# Patient Record
Sex: MALE | Race: WHITE | NOT HISPANIC OR LATINO | Employment: UNEMPLOYED | URBAN - NONMETROPOLITAN AREA
[De-identification: names, ages, dates, MRNs, and addresses within clinical notes are randomized per-mention and may not be internally consistent; named-entity substitution may affect disease eponyms.]

---

## 2023-08-21 ENCOUNTER — OFFICE VISIT (OUTPATIENT)
Dept: PSYCHIATRY | Facility: CLINIC | Age: 23
End: 2023-08-21
Payer: COMMERCIAL

## 2023-08-21 VITALS
SYSTOLIC BLOOD PRESSURE: 130 MMHG | DIASTOLIC BLOOD PRESSURE: 84 MMHG | HEIGHT: 71 IN | HEART RATE: 82 BPM | WEIGHT: 170.4 LBS | BODY MASS INDEX: 23.85 KG/M2

## 2023-08-21 DIAGNOSIS — F33.1 MAJOR DEPRESSIVE DISORDER, RECURRENT EPISODE, MODERATE: ICD-10-CM

## 2023-08-21 DIAGNOSIS — Z79.899 MEDICATION MANAGEMENT: ICD-10-CM

## 2023-08-21 DIAGNOSIS — F41.1 GENERALIZED ANXIETY DISORDER: Primary | ICD-10-CM

## 2023-08-21 DIAGNOSIS — F41.0 PANIC ATTACKS: ICD-10-CM

## 2023-08-21 LAB
EXTERNAL AMPHETAMINE SCREEN URINE: NEGATIVE
EXTERNAL BENZODIAZEPINE SCREEN URINE: NEGATIVE
EXTERNAL BUPRENORPHINE SCREEN URINE: NEGATIVE
EXTERNAL COCAINE SCREEN URINE: NEGATIVE
EXTERNAL MDMA: NEGATIVE
EXTERNAL METHADONE SCREEN URINE: NEGATIVE
EXTERNAL METHAMPHETAMINE SCREEN URINE: NEGATIVE
EXTERNAL OPIATES SCREEN URINE: NEGATIVE
EXTERNAL OXYCODONE SCREEN URINE: NEGATIVE
EXTERNAL THC SCREEN URINE: POSITIVE

## 2023-08-21 PROCEDURE — 90792 PSYCH DIAG EVAL W/MED SRVCS: CPT | Performed by: NURSE PRACTITIONER

## 2023-08-21 PROCEDURE — 1159F MED LIST DOCD IN RCRD: CPT | Performed by: NURSE PRACTITIONER

## 2023-08-21 PROCEDURE — 1160F RVW MEDS BY RX/DR IN RCRD: CPT | Performed by: NURSE PRACTITIONER

## 2023-08-21 RX ORDER — FLUOXETINE 10 MG/1
CAPSULE ORAL
COMMUNITY
Start: 2023-07-27 | End: 2023-08-22

## 2023-08-21 NOTE — PROGRESS NOTES
"Mehran Grace is a 23 y.o. male who is here today for initial appointment to evaluate for medication options.     Chief Complaint: Anxiety    HPI: Patient presents as initial evaluation as referred by PCP.  States at 19 he began having panic attacks with increased anxiety and was started on fluoxetine.  States he took medication for an extended period of time and stopped as he felt she no longer needed it.  States he restarted medication 5 months ago due to reoccurring symptoms however he feels it has not been helpful.  States previously he was taking 20 mg and PCP had increased it to 30 and at that time he felt it made her anxiety worse.  States he recently underwent some changes as far as quitting his job to begin a different 1 and is currently living with his mother to help her as she is healing from an injury.  Also reports having panic attacks consisting of feeling short of breath, shakiness, uncontrollable crying, and overwhelming sense of doom, states these episodes last 5-6 minutes and occur 1-2 times per week.  The patient reports the following symptoms of anxiety: constant anxiety/worry, restlessness/on edge, difficulty concentrating, mind goes blank, irritability, muscle tension, and sleep disturbance and have caused impairment in important areas of functioning. Anxiety rated 7/10 with 10 being the worst.   The patient reports depressive symptoms including depressed mood, crying spells, anhedonia, feelings of guilt, feelings of hopelessness, feelings of worthlessness, low energy, and difficulty concentrating, and have caused impairment in important areas of functioning.  Depression rated 7/10 with 10 being the worst.   Also reports drinking alcohol primarily on the weekends to \"self-medicate\" his anxiety, states he is drinking 12-18 beers a night when he does drink.  He denies any symptoms of tika.  He reports sleep fluctuates depending on outcome of the day.  Reports appetite is good.  He " "denies any self-harm.  He denies SI/HI/AVH.        The following portions of the patient's history were reviewed and updated as appropriate: allergies, current medications, past family history, past medical history, past social history, past surgical history and problem list.    Past Psych History: Reports being adopted and raised by grandparents, he has no contact with bio parents.  Denies any history of abuse.     Previous Psych Meds: Fluoxetine    Substance Abuse: Reports using delta 8 \"rarely\".  Drinks alcohol on the weekends consisting of 12-18 beers and 1-2 mixed drinks.    Social History: Patient from [facility], currently living in [facility] with his mother.  He is currently unemployed as he is caring for his mother.  Has high school diploma.  No .  Denies any legal issues.    Family History:  Family History   Problem Relation Age of Onset    No Known Problems Mother     No Known Problems Father        Past Surgical History:  History reviewed. No pertinent surgical history.    Problem List:  There is no problem list on file for this patient.      Allergy:  No Known Allergies      Current Medications:   Current Outpatient Medications   Medication Sig Dispense Refill    propranolol (INDERAL) 10 MG tablet Take 1 tablet by mouth 2 (two) times a day as needed for anxiety. 60 tablet 1    sertraline (Zoloft) 50 MG tablet Take 0.5 tablet by mouth daily for 7 days then increase to 1 tablet by mouth daily 30 tablet 0     No current facility-administered medications for this visit.       Review of Systems  Review of Systems   Constitutional:  Positive for fatigue.   HENT: Negative.     Eyes: Negative.    Respiratory: Negative.     Cardiovascular: Negative.    Gastrointestinal: Negative.    Genitourinary: Negative.    Musculoskeletal: Negative.    Skin: Negative.    Neurological: Negative.    Psychiatric/Behavioral:  Positive for decreased concentration, sleep disturbance and depressed mood. " "Negative for suicidal ideas. The patient is nervous/anxious.      Objective   Physical Exam  Blood pressure 130/84, pulse 82, height 180.3 cm (71\"), weight 77.3 kg (170 lb 6.4 oz).    Appearance: Well nourished male, appropriately dressed, appears stated age and in no acute distress  Gait, Station, Strength: WNL    Mental Status Exam:   Hygiene:   good  Cooperation:  Cooperative  Eye Contact:  Good  Psychomotor Behavior:  Appropriate  Affect:  Appropriate  Hopelessness: Denies  Speech:  Normal  Thought Process:  Linear  Thought Content:  Mood congruent  Suicidal:  None  Homicidal:  None  Hallucinations:  None  Delusion:  None  Memory:  Intact  Orientation:  Person, Place, Time, and Situation  Reliability:  good  Insight:  Good  Judgement:  Fair  Impulse Control:  Good  Physical/Medical Issues:  No     PHQ-Score Total:  PHQ-9 Total Score:   8   Patient screened positive for depression based on a PHQ-9 score of 8 on 8/21/2023. Follow-up recommendations include: Prescribed antidepressant medication treatment and Suicide Risk Assessment performed.        Lab Results:   Office Visit on 08/21/2023   Component Date Value Ref Range Status    External Amphetamine Screen Urine 08/21/2023 Negative   Final    External Benzodiazepine Screen Uri* 08/21/2023 Negative   Final    External Cocaine Screen Urine 08/21/2023 Negative   Final    External THC Screen Urine 08/21/2023 Positive (A)   Final    External Methadone Screen Urine 08/21/2023 Negative   Final    External Methamphetamine Screen Ur* 08/21/2023 Negative   Final    External Oxycodone Screen Urine 08/21/2023 Negative   Final    External Buprenorphine Screen Urine 08/21/2023 Negative   Final    External MDMA 08/21/2023 Negative   Final    External Opiates Screen Urine 08/21/2023 Negative   Final       Assessment & Plan   Diagnoses and all orders for this visit:    1. Generalized anxiety disorder (Primary)    2. Medication management  -     [facility] Drug Screen    3. Panic " attacks    4. Major depressive disorder, recurrent episode, moderate    Other orders  -     propranolol (INDERAL) 10 MG tablet; Take 1 tablet by mouth 2 (two) times a day as needed for anxiety.  Dispense: 60 tablet; Refill: 1  -     sertraline (Zoloft) 50 MG tablet; Take 0.5 tablet by mouth daily for 7 days then increase to 1 tablet by mouth daily  Dispense: 30 tablet; Refill: 0      -Begin cross taper of fluoxetine with sertraline  -Decrease fluoxetine 20 mg daily for 7 days and discontinue  -Begin sertraline 25 mg daily for 7 days then increase to 50 mg daily for anxiety and depression.  Patient was educated concerning Black Box Warning of increased suicidal thoughts and behaviors with SSRIs   -Begin propranolol 10 mg twice daily as needed for anxiety  -Encouraged patient to begin psychotherapy  -Patient's [place] tox was positive for THC. Negative effects of THC use discussed and patient advised against use of THC. Long term effects reviewed including but not limited to:  potential interruption of brain connectivity,  poor memory, impaired cognition, psychosis fall, oversedation especially when used with opioids   -LAUREANO reviewed and appropriate. Patient counseled on use of controlled substances.   -The benefits of a healthy diet and exercise were discussed with patient, especially the positive effects they have on mental health. Patient encouraged to consider lifestyle modification regarding  diet and exercise patterns to maximize results of mental health treatment.  -Reviewed previous available documentation  -Reviewed most recent available labs                  Visit Diagnoses:    ICD-10-CM ICD-9-CM   1. Generalized anxiety disorder  F41.1 300.02   2. Medication management  Z79.899 V58.69   3. Panic attacks  F41.0 300.01   4. Major depressive disorder, recurrent episode, moderate  F33.1 296.32       TREATMENT PLAN/GOALS: Continue supportive psychotherapy efforts and medications as indicated. Treatment and  medication options discussed during today's visit. Patient acknowledged and verbally consented to continue with current treatment plan and was educated on the importance of compliance with treatment and follow-up appointments.    MEDICATION ISSUES:  Discussed medication options and treatment plan of prescribed medication as well as the risks, benefits, and side effects including potential falls, possible impaired driving and metabolic adversities among others. Patient is agreeable to call the office with any worsening of symptoms or onset of side effects. Patient is agreeable to call 911 or go to the nearest ER should he/she begin having SI/HI.     MEDS ORDERED DURING VISIT:  New Medications Ordered This Visit   Medications    propranolol (INDERAL) 10 MG tablet     Sig: Take 1 tablet by mouth 2 (two) times a day as needed for anxiety.     Dispense:  60 tablet     Refill:  1    sertraline (Zoloft) 50 MG tablet     Sig: Take 0.5 tablet by mouth daily for 7 days then increase to 1 tablet by mouth daily     Dispense:  30 tablet     Refill:  0     Return in about 4 weeks (around 9/18/2023), or if symptoms worsen or fail to improve.         Prognosis: Guarded dependent on medication/follow up and treatment plan compliance.  Functionality: pt showing improvements in important areas of daily functioning.     Short-term goals: Patient will adhere to medication regimen and note continued improvement in symptoms over the next 3 months.   Long-term goals: Patient will be adherent to medication management and psychotherapy with continued improvement in symptoms over the next 6 months      This document has been electronically signed by CANDICE Olguin   August 22, 2023 15:24 EDT    Part of this note may be an electronic transcription/translation of spoken language to printed text using the Dragon Dictation System.

## 2023-08-22 RX ORDER — PROPRANOLOL HYDROCHLORIDE 10 MG/1
10 TABLET ORAL 2 TIMES DAILY PRN
Qty: 60 TABLET | Refills: 1 | Status: SHIPPED | OUTPATIENT
Start: 2023-08-22

## 2023-10-02 ENCOUNTER — HOSPITAL ENCOUNTER (EMERGENCY)
Facility: HOSPITAL | Age: 23
Discharge: PSYCHIATRIC OR SUBSTANCE USE FACILITY | End: 2023-10-02
Attending: STUDENT IN AN ORGANIZED HEALTH CARE EDUCATION/TRAINING PROGRAM | Admitting: STUDENT IN AN ORGANIZED HEALTH CARE EDUCATION/TRAINING PROGRAM
Payer: COMMERCIAL

## 2023-10-02 ENCOUNTER — HOSPITAL ENCOUNTER (INPATIENT)
Facility: HOSPITAL | Age: 23
LOS: 7 days | Discharge: HOME | DRG: 885 | End: 2023-10-09
Attending: PSYCHIATRY & NEUROLOGY | Admitting: PSYCHIATRY & NEUROLOGY
Payer: COMMERCIAL

## 2023-10-02 VITALS
TEMPERATURE: 97.4 F | HEART RATE: 72 BPM | OXYGEN SATURATION: 98 % | BODY MASS INDEX: 23.8 KG/M2 | WEIGHT: 170 LBS | HEIGHT: 71 IN | RESPIRATION RATE: 18 BRPM | DIASTOLIC BLOOD PRESSURE: 83 MMHG | SYSTOLIC BLOOD PRESSURE: 130 MMHG

## 2023-10-02 DIAGNOSIS — F32.A DEPRESSION WITH SUICIDAL IDEATION: Primary | ICD-10-CM

## 2023-10-02 DIAGNOSIS — R45.851 DEPRESSION WITH SUICIDAL IDEATION: Primary | ICD-10-CM

## 2023-10-02 PROBLEM — F32.9 MDD (MAJOR DEPRESSIVE DISORDER): Status: ACTIVE | Noted: 2023-10-02

## 2023-10-02 LAB
ALBUMIN SERPL-MCNC: 5.1 G/DL (ref 3.5–5.2)
ALBUMIN/GLOB SERPL: 2 G/DL
ALP SERPL-CCNC: 130 U/L (ref 39–117)
ALT SERPL W P-5'-P-CCNC: 51 U/L (ref 1–41)
AMPHET+METHAMPHET UR QL: NEGATIVE
AMPHETAMINES UR QL: NEGATIVE
ANION GAP SERPL CALCULATED.3IONS-SCNC: 12.5 MMOL/L (ref 5–15)
AST SERPL-CCNC: 38 U/L (ref 1–40)
BARBITURATES UR QL SCN: NEGATIVE
BASOPHILS # BLD AUTO: 0.03 10*3/MM3 (ref 0–0.2)
BASOPHILS NFR BLD AUTO: 0.5 % (ref 0–1.5)
BENZODIAZ UR QL SCN: NEGATIVE
BILIRUB SERPL-MCNC: 1.5 MG/DL (ref 0–1.2)
BILIRUB UR QL STRIP: NEGATIVE
BUN SERPL-MCNC: 14 MG/DL (ref 6–20)
BUN/CREAT SERPL: 15.6 (ref 7–25)
BUPRENORPHINE SERPL-MCNC: NEGATIVE NG/ML
CALCIUM SPEC-SCNC: 10.3 MG/DL (ref 8.6–10.5)
CANNABINOIDS SERPL QL: POSITIVE
CHLORIDE SERPL-SCNC: 103 MMOL/L (ref 98–107)
CLARITY UR: CLEAR
CO2 SERPL-SCNC: 25.5 MMOL/L (ref 22–29)
COCAINE UR QL: NEGATIVE
COLOR UR: YELLOW
CREAT SERPL-MCNC: 0.9 MG/DL (ref 0.76–1.27)
DEPRECATED RDW RBC AUTO: 37.3 FL (ref 37–54)
EGFRCR SERPLBLD CKD-EPI 2021: 124.6 ML/MIN/1.73
EOSINOPHIL # BLD AUTO: 0.1 10*3/MM3 (ref 0–0.4)
EOSINOPHIL NFR BLD AUTO: 1.6 % (ref 0.3–6.2)
ERYTHROCYTE [DISTWIDTH] IN BLOOD BY AUTOMATED COUNT: 11.6 % (ref 12.3–15.4)
ETHANOL BLD-MCNC: <10 MG/DL (ref 0–10)
ETHANOL UR QL: <0.01 %
FENTANYL UR-MCNC: NEGATIVE NG/ML
GLOBULIN UR ELPH-MCNC: 2.5 GM/DL
GLUCOSE SERPL-MCNC: 100 MG/DL (ref 65–99)
GLUCOSE UR STRIP-MCNC: NEGATIVE MG/DL
HAV IGM SERPL QL IA: NORMAL
HBV CORE IGM SERPL QL IA: NORMAL
HBV SURFACE AG SERPL QL IA: NORMAL
HCT VFR BLD AUTO: 42.7 % (ref 37.5–51)
HCV AB SER DONR QL: NORMAL
HGB BLD-MCNC: 15 G/DL (ref 13–17.7)
HGB UR QL STRIP.AUTO: NEGATIVE
HOLD SPECIMEN: NORMAL
HOLD SPECIMEN: NORMAL
IMM GRANULOCYTES # BLD AUTO: 0.01 10*3/MM3 (ref 0–0.05)
IMM GRANULOCYTES NFR BLD AUTO: 0.2 % (ref 0–0.5)
KETONES UR QL STRIP: NEGATIVE
LEUKOCYTE ESTERASE UR QL STRIP.AUTO: NEGATIVE
LYMPHOCYTES # BLD AUTO: 2.01 10*3/MM3 (ref 0.7–3.1)
LYMPHOCYTES NFR BLD AUTO: 31.8 % (ref 19.6–45.3)
MAGNESIUM SERPL-MCNC: 1.9 MG/DL (ref 1.6–2.6)
MCH RBC QN AUTO: 30.9 PG (ref 26.6–33)
MCHC RBC AUTO-ENTMCNC: 35.1 G/DL (ref 31.5–35.7)
MCV RBC AUTO: 87.9 FL (ref 79–97)
METHADONE UR QL SCN: NEGATIVE
MONOCYTES # BLD AUTO: 0.65 10*3/MM3 (ref 0.1–0.9)
MONOCYTES NFR BLD AUTO: 10.3 % (ref 5–12)
NEUTROPHILS NFR BLD AUTO: 3.53 10*3/MM3 (ref 1.7–7)
NEUTROPHILS NFR BLD AUTO: 55.6 % (ref 42.7–76)
NITRITE UR QL STRIP: NEGATIVE
NRBC BLD AUTO-RTO: 0 /100 WBC (ref 0–0.2)
OPIATES UR QL: NEGATIVE
OXYCODONE UR QL SCN: NEGATIVE
PCP UR QL SCN: NEGATIVE
PH UR STRIP.AUTO: 7 [PH] (ref 5–8)
PLATELET # BLD AUTO: 262 10*3/MM3 (ref 140–450)
PMV BLD AUTO: 10.4 FL (ref 6–12)
POTASSIUM SERPL-SCNC: 4.2 MMOL/L (ref 3.5–5.2)
PROPOXYPH UR QL: NEGATIVE
PROT SERPL-MCNC: 7.6 G/DL (ref 6–8.5)
PROT UR QL STRIP: NEGATIVE
QT INTERVAL: 388 MS
QTC INTERVAL: 393 MS
RBC # BLD AUTO: 4.86 10*6/MM3 (ref 4.14–5.8)
SODIUM SERPL-SCNC: 141 MMOL/L (ref 136–145)
SP GR UR STRIP: 1.02 (ref 1–1.03)
TRICYCLICS UR QL SCN: NEGATIVE
UROBILINOGEN UR QL STRIP: NORMAL
WBC NRBC COR # BLD: 6.33 10*3/MM3 (ref 3.4–10.8)
WHOLE BLOOD HOLD COAG: NORMAL
WHOLE BLOOD HOLD SPECIMEN: NORMAL

## 2023-10-02 PROCEDURE — 80307 DRUG TEST PRSMV CHEM ANLYZR: CPT | Performed by: PHYSICIAN ASSISTANT

## 2023-10-02 PROCEDURE — 85025 COMPLETE CBC W/AUTO DIFF WBC: CPT | Performed by: PHYSICIAN ASSISTANT

## 2023-10-02 PROCEDURE — 80074 ACUTE HEPATITIS PANEL: CPT | Performed by: PSYCHIATRY & NEUROLOGY

## 2023-10-02 PROCEDURE — 93005 ELECTROCARDIOGRAM TRACING: CPT | Performed by: PSYCHIATRY & NEUROLOGY

## 2023-10-02 PROCEDURE — 81003 URINALYSIS AUTO W/O SCOPE: CPT | Performed by: PHYSICIAN ASSISTANT

## 2023-10-02 PROCEDURE — 99285 EMERGENCY DEPT VISIT HI MDM: CPT

## 2023-10-02 PROCEDURE — 80053 COMPREHEN METABOLIC PANEL: CPT | Performed by: PHYSICIAN ASSISTANT

## 2023-10-02 PROCEDURE — 82077 ASSAY SPEC XCP UR&BREATH IA: CPT | Performed by: PHYSICIAN ASSISTANT

## 2023-10-02 PROCEDURE — 83735 ASSAY OF MAGNESIUM: CPT | Performed by: PHYSICIAN ASSISTANT

## 2023-10-02 PROCEDURE — 36415 COLL VENOUS BLD VENIPUNCTURE: CPT

## 2023-10-02 RX ORDER — TRAZODONE HYDROCHLORIDE 50 MG/1
50 TABLET ORAL NIGHTLY PRN
Status: DISCONTINUED | OUTPATIENT
Start: 2023-10-02 | End: 2023-10-09 | Stop reason: HOSPADM

## 2023-10-02 RX ORDER — PROPRANOLOL HYDROCHLORIDE 10 MG/1
10 TABLET ORAL 2 TIMES DAILY PRN
Status: DISCONTINUED | OUTPATIENT
Start: 2023-10-02 | End: 2023-10-09 | Stop reason: HOSPADM

## 2023-10-02 RX ORDER — BENZONATATE 100 MG/1
100 CAPSULE ORAL 3 TIMES DAILY PRN
Status: DISCONTINUED | OUTPATIENT
Start: 2023-10-02 | End: 2023-10-09 | Stop reason: HOSPADM

## 2023-10-02 RX ORDER — LOPERAMIDE HYDROCHLORIDE 2 MG/1
2 CAPSULE ORAL
Status: DISCONTINUED | OUTPATIENT
Start: 2023-10-02 | End: 2023-10-09 | Stop reason: HOSPADM

## 2023-10-02 RX ORDER — FAMOTIDINE 20 MG/1
20 TABLET, FILM COATED ORAL 2 TIMES DAILY PRN
Status: DISCONTINUED | OUTPATIENT
Start: 2023-10-02 | End: 2023-10-09 | Stop reason: HOSPADM

## 2023-10-02 RX ORDER — IBUPROFEN 400 MG/1
400 TABLET ORAL EVERY 6 HOURS PRN
Status: DISCONTINUED | OUTPATIENT
Start: 2023-10-02 | End: 2023-10-09 | Stop reason: HOSPADM

## 2023-10-02 RX ORDER — ACETAMINOPHEN 325 MG/1
650 TABLET ORAL EVERY 6 HOURS PRN
Status: DISCONTINUED | OUTPATIENT
Start: 2023-10-02 | End: 2023-10-02

## 2023-10-02 RX ORDER — ECHINACEA PURPUREA EXTRACT 125 MG
2 TABLET ORAL AS NEEDED
Status: DISCONTINUED | OUTPATIENT
Start: 2023-10-02 | End: 2023-10-09 | Stop reason: HOSPADM

## 2023-10-02 RX ORDER — MULTIVITAMIN WITH IRON
2 TABLET ORAL DAILY
Status: DISCONTINUED | OUTPATIENT
Start: 2023-10-02 | End: 2023-10-09 | Stop reason: HOSPADM

## 2023-10-02 RX ORDER — HYDROXYZINE 50 MG/1
50 TABLET, FILM COATED ORAL EVERY 6 HOURS PRN
Status: DISCONTINUED | OUTPATIENT
Start: 2023-10-02 | End: 2023-10-09 | Stop reason: HOSPADM

## 2023-10-02 RX ORDER — BENZTROPINE MESYLATE 1 MG/1
2 TABLET ORAL ONCE AS NEEDED
Status: DISCONTINUED | OUTPATIENT
Start: 2023-10-02 | End: 2023-10-09 | Stop reason: HOSPADM

## 2023-10-02 RX ORDER — ONDANSETRON 4 MG/1
4 TABLET, FILM COATED ORAL EVERY 6 HOURS PRN
Status: DISCONTINUED | OUTPATIENT
Start: 2023-10-02 | End: 2023-10-09 | Stop reason: HOSPADM

## 2023-10-02 RX ORDER — BENZTROPINE MESYLATE 1 MG/ML
1 INJECTION INTRAMUSCULAR; INTRAVENOUS ONCE AS NEEDED
Status: DISCONTINUED | OUTPATIENT
Start: 2023-10-02 | End: 2023-10-09 | Stop reason: HOSPADM

## 2023-10-02 RX ORDER — MULTIPLE VITAMINS W/ MINERALS TAB 9MG-400MCG
1 TAB ORAL DAILY
Status: DISCONTINUED | OUTPATIENT
Start: 2023-10-02 | End: 2023-10-09 | Stop reason: HOSPADM

## 2023-10-02 RX ORDER — ALUMINA, MAGNESIA, AND SIMETHICONE 2400; 2400; 240 MG/30ML; MG/30ML; MG/30ML
15 SUSPENSION ORAL EVERY 6 HOURS PRN
Status: DISCONTINUED | OUTPATIENT
Start: 2023-10-02 | End: 2023-10-09 | Stop reason: HOSPADM

## 2023-10-02 RX ORDER — NICOTINE 21 MG/24HR
1 PATCH, TRANSDERMAL 24 HOURS TRANSDERMAL
Status: DISCONTINUED | OUTPATIENT
Start: 2023-10-02 | End: 2023-10-09 | Stop reason: HOSPADM

## 2023-10-02 RX ADMIN — Medication 1 TABLET: at 14:35

## 2023-10-02 RX ADMIN — SERTRALINE 50 MG: 50 TABLET, FILM COATED ORAL at 17:04

## 2023-10-02 RX ADMIN — Medication 100 MG: at 14:35

## 2023-10-02 RX ADMIN — Medication 2 TABLET: at 14:35
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
## 2023-10-02 NOTE — ED PROVIDER NOTES
"Subjective   History of Present Illness  23-year-old male who presents to the ED today for a mental health evaluation.  He states he has been having increased anxiety and \"mental collapses\" for about 6 months.  He denies any specific trigger.  He denied any suicidal or homicidal ideations to me however he did tell the intake nurse that he did have suicidal ideations 2 days ago with no specific plan.  He reports that when he has issues with his mental health he drinks a lot of alcohol.  He states that he has been drinking at least once a week and will drink 16 ounces of liquor at a time.  He states his last drink was 2 days ago.  He states he has also been using delta 8.  He states his appetite has been normal but his sleep has been poor.  He does have a mental health provider that he sees but he reports missing an appointment a couple days ago.    History provided by:  Patient  Mental Health Problem  Presenting symptoms: depression and suicidal thoughts    Patient accompanied by:  Parent  Degree of incapacity (severity):  Moderate  Onset quality:  Gradual  Duration:  6 months  Timing:  Constant  Progression:  Waxing and waning  Chronicity:  New  Context: alcohol use and drug abuse    Relieved by:  Nothing  Worsened by:  Alcohol  Associated symptoms: anxiety and insomnia    Associated symptoms: no appetite change    Risk factors: hx of mental illness      Review of Systems   Constitutional: Negative.  Negative for appetite change.   HENT: Negative.     Eyes: Negative.    Respiratory: Negative.     Cardiovascular: Negative.    Gastrointestinal: Negative.    Genitourinary: Negative.    Musculoskeletal: Negative.    Skin: Negative.    Psychiatric/Behavioral:  Positive for dysphoric mood, sleep disturbance and suicidal ideas. The patient is nervous/anxious and has insomnia.    All other systems reviewed and are negative.    Past Medical History:   Diagnosis Date    Anxiety     Depression     Suicidal thoughts        No " Known Allergies    Past Surgical History:   Procedure Laterality Date    NO PAST SURGERIES         Family History   Problem Relation Age of Onset    Drug abuse Mother     Drug abuse Father        Social History     Socioeconomic History    Marital status: Single    Number of children: 0    Highest education level: High school graduate   Tobacco Use    Smoking status: Never    Smokeless tobacco: Never   Vaping Use    Vaping Use: Never used   Substance and Sexual Activity    Alcohol use: Yes     Comment: See below    Drug use: Not Currently     Types: Marijuana    Sexual activity: Yes     Partners: Female           Objective   Physical Exam  Vitals and nursing note reviewed.   Constitutional:       General: He is not in acute distress.     Appearance: Normal appearance. He is not diaphoretic.   HENT:      Head: Normocephalic and atraumatic.      Right Ear: External ear normal.      Left Ear: External ear normal.      Nose: Nose normal.   Eyes:      Conjunctiva/sclera: Conjunctivae normal.      Pupils: Pupils are equal, round, and reactive to light.   Cardiovascular:      Rate and Rhythm: Normal rate and regular rhythm.      Pulses: Normal pulses.      Heart sounds: Normal heart sounds.   Pulmonary:      Effort: Pulmonary effort is normal.      Breath sounds: Normal breath sounds.   Abdominal:      General: Bowel sounds are normal.      Palpations: Abdomen is soft.   Musculoskeletal:         General: Normal range of motion.      Cervical back: Normal range of motion and neck supple.   Skin:     General: Skin is warm and dry.      Capillary Refill: Capillary refill takes less than 2 seconds.   Neurological:      General: No focal deficit present.      Mental Status: He is alert and oriented to person, place, and time.   Psychiatric:         Mood and Affect: Mood is depressed.         Speech: Speech normal.         Behavior: Behavior normal. Behavior is cooperative.         Thought Content: Thought content does not  include homicidal or suicidal ideation.       Procedures       Results for orders placed or performed during the hospital encounter of 10/02/23   Comprehensive Metabolic Panel    Specimen: Arm, Right; Blood   Result Value Ref Range    Glucose 100 (H) 65 - 99 mg/dL    BUN 14 6 - 20 mg/dL    Creatinine 0.90 0.76 - 1.27 mg/dL    Sodium 141 136 - 145 mmol/L    Potassium 4.2 3.5 - 5.2 mmol/L    Chloride 103 98 - 107 mmol/L    CO2 25.5 22.0 - 29.0 mmol/L    Calcium 10.3 8.6 - 10.5 mg/dL    Total Protein 7.6 6.0 - 8.5 g/dL    Albumin 5.1 3.5 - 5.2 g/dL    ALT (SGPT) 51 (H) 1 - 41 U/L    AST (SGOT) 38 1 - 40 U/L    Alkaline Phosphatase 130 (H) 39 - 117 U/L    Total Bilirubin 1.5 (H) 0.0 - 1.2 mg/dL    Globulin 2.5 gm/dL    A/G Ratio 2.0 g/dL    BUN/Creatinine Ratio 15.6 7.0 - 25.0    Anion Gap 12.5 5.0 - 15.0 mmol/L    eGFR 124.6 >60.0 mL/min/1.73   Urinalysis With Microscopic If Indicated (No Culture) - Urine, Clean Catch    Specimen: Urine, Clean Catch   Result Value Ref Range    Color, UA Yellow Yellow, Straw    Appearance, UA Clear Clear    pH, UA 7.0 5.0 - 8.0    Specific Gravity, UA 1.018 1.005 - 1.030    Glucose, UA Negative Negative    Ketones, UA Negative Negative    Bilirubin, UA Negative Negative    Blood, UA Negative Negative    Protein, UA Negative Negative    Leuk Esterase, UA Negative Negative    Nitrite, UA Negative Negative    Urobilinogen, UA 1.0 E.U./dL 0.2 - 1.0 E.U./dL   Ethanol    Specimen: Arm, Right; Blood   Result Value Ref Range    Ethanol <10 0 - 10 mg/dL    Ethanol % <0.010 %   Urine Drug Screen - Urine, Clean Catch    Specimen: Urine, Clean Catch   Result Value Ref Range    THC, Screen, Urine Positive (A) Negative    Phencyclidine (PCP), Urine Negative Negative    Cocaine Screen, Urine Negative Negative    Methamphetamine, Ur Negative Negative    Opiate Screen Negative Negative    Amphetamine Screen, Urine Negative Negative    Benzodiazepine Screen, Urine Negative Negative    Tricyclic  Antidepressants Screen Negative Negative    Methadone Screen, Urine Negative Negative    Barbiturates Screen, Urine Negative Negative    Oxycodone Screen, Urine Negative Negative    Propoxyphene Screen Negative Negative    Buprenorphine, Screen, Urine Negative Negative   Magnesium    Specimen: Arm, Right; Blood   Result Value Ref Range    Magnesium 1.9 1.6 - 2.6 mg/dL   CBC Auto Differential    Specimen: Arm, Right; Blood   Result Value Ref Range    WBC 6.33 3.40 - 10.80 10*3/mm3    RBC 4.86 4.14 - 5.80 10*6/mm3    Hemoglobin 15.0 13.0 - 17.7 g/dL    Hematocrit 42.7 37.5 - 51.0 %    MCV 87.9 79.0 - 97.0 fL    MCH 30.9 26.6 - 33.0 pg    MCHC 35.1 31.5 - 35.7 g/dL    RDW 11.6 (L) 12.3 - 15.4 %    RDW-SD 37.3 37.0 - 54.0 fl    MPV 10.4 6.0 - 12.0 fL    Platelets 262 140 - 450 10*3/mm3    Neutrophil % 55.6 42.7 - 76.0 %    Lymphocyte % 31.8 19.6 - 45.3 %    Monocyte % 10.3 5.0 - 12.0 %    Eosinophil % 1.6 0.3 - 6.2 %    Basophil % 0.5 0.0 - 1.5 %    Immature Grans % 0.2 0.0 - 0.5 %    Neutrophils, Absolute 3.53 1.70 - 7.00 10*3/mm3    Lymphocytes, Absolute 2.01 0.70 - 3.10 10*3/mm3    Monocytes, Absolute 0.65 0.10 - 0.90 10*3/mm3    Eosinophils, Absolute 0.10 0.00 - 0.40 10*3/mm3    Basophils, Absolute 0.03 0.00 - 0.20 10*3/mm3    Immature Grans, Absolute 0.01 0.00 - 0.05 10*3/mm3    nRBC 0.0 0.0 - 0.2 /100 WBC   Fentanyl, Urine - Urine, Clean Catch    Specimen: Urine, Clean Catch   Result Value Ref Range    Fentanyl, Urine Negative Negative   Green Top (Gel)   Result Value Ref Range    Extra Tube Hold for add-ons.    Lavender Top   Result Value Ref Range    Extra Tube hold for add-on    Gold Top - SST   Result Value Ref Range    Extra Tube Hold for add-ons.    Light Blue Top   Result Value Ref Range    Extra Tube Hold for add-ons.           ED Course  ED Course as of 10/02/23 1500   Mon Oct 02, 2023   1300 Medically clear for psych [AH]      ED Course User Index  [AH] Mindi Chau, PA                                            Medical Decision Making  23-year-old male who presents to the ED today for mental health evaluation.  He was medically cleared for the evaluation.  Psychiatry was consulted who recommended inpatient admission.    Problems Addressed:  Depression with suicidal ideation: complicated acute illness or injury    Amount and/or Complexity of Data Reviewed  Labs: ordered.    Risk  Decision regarding hospitalization.        Final diagnoses:   Depression with suicidal ideation       ED Disposition  ED Disposition       ED Disposition   DC/Transfer to Behavioral Health Condition   Stable    Comment   --               No follow-up provider specified.       Medication List      No changes were made to your prescriptions during this visit.            Mindi Chau PA  10/02/23 1500

## 2023-10-02 NOTE — NURSING NOTE
Patient searched per policy by two staff members. Items logged and placed in intake locker. Safety Checks in place.

## 2023-10-02 NOTE — PLAN OF CARE
Problem: Adult Behavioral Health Plan of Care  Goal: Plan of Care Review  Recent Flowsheet Documentation  Taken 10/2/2023 1451 by Brittani Thomas, RN  Plan of Care Reviewed With: patient  Patient Agreement with Plan of Care: agrees   Goal Outcome Evaluation:  Plan of Care Reviewed With: patient  Patient Agreement with Plan of Care: agrees      New admission.  Care plan initiated.

## 2023-10-02 NOTE — PLAN OF CARE
Problem: Adult Behavioral Health Plan of Care  Goal: Plan of Care Review  Outcome: Ongoing, Progressing  Flowsheets  Taken 10/2/2023 1541 by Anurag Bales, RN  Progress: no change  Outcome Evaluation: NEW ADMISSION  Taken 10/2/2023 1451 by Brittani Thomas, RN  Plan of Care Reviewed With: patient  Patient Agreement with Plan of Care: agrees   Goal Outcome Evaluation:           Progress: no change  Outcome Evaluation: NEW ADMISSION

## 2023-10-03 PROBLEM — R45.851 SUICIDAL IDEATION: Status: ACTIVE | Noted: 2023-10-03

## 2023-10-03 PROBLEM — F33.2 SEVERE EPISODE OF RECURRENT MAJOR DEPRESSIVE DISORDER, WITHOUT PSYCHOTIC FEATURES: Status: ACTIVE | Noted: 2023-10-02

## 2023-10-03 PROBLEM — F10.20 ALCOHOL USE DISORDER, SEVERE, DEPENDENCE: Status: ACTIVE | Noted: 2023-10-03

## 2023-10-03 PROBLEM — F41.1 GENERALIZED ANXIETY DISORDER WITH PANIC ATTACKS: Status: ACTIVE | Noted: 2023-10-03

## 2023-10-03 PROBLEM — F41.0 GENERALIZED ANXIETY DISORDER WITH PANIC ATTACKS: Status: ACTIVE | Noted: 2023-10-03

## 2023-10-03 PROCEDURE — 99223 1ST HOSP IP/OBS HIGH 75: CPT | Performed by: PSYCHIATRY & NEUROLOGY

## 2023-10-03 RX ADMIN — Medication 100 MG: at 09:25

## 2023-10-03 RX ADMIN — SERTRALINE 50 MG: 50 TABLET, FILM COATED ORAL at 09:25

## 2023-10-03 RX ADMIN — Medication 2 TABLET: at 09:25

## 2023-10-03 RX ADMIN — Medication 1 TABLET: at 09:25

## 2023-10-03 NOTE — PLAN OF CARE
Goal Outcome Evaluation:  Plan of Care Reviewed With: patient  Patient Agreement with Plan of Care: agrees     Progress: improving  Outcome Evaluation: Pt reports good appetite and poor sleep. Anxiety 4/10 and depression 6/10. Denies SI/HI and AVH.

## 2023-10-03 NOTE — H&P
INITIAL PSYCHIATRIC HISTORY & PHYSICAL    Patient Identification:  Name:   Bryon Grace  Age:   23 y.o.  Sex:   male  :   2000  MRN:   [id number]  Visit Number:   [id number]  Primary Care Physician:   Provider, No Known    SUBJECTIVE    CC/Focus of Exam: Depression    HPI: Bryon Grace is a 23 y.o. male who was admitted on 10/2/2023 with complaints of depression.  Patient states he is here requesting help for anxiety and depression.  Patient states he has had suicidal thoughts 2 days ago with no plan.  Patient states everything just keeps getting worse and I cannot live like this.  Patient states he has increased panic attacks.  Patient states he does not have any control or anything patient states he had quit his job and everything.  Per intake note patient's mother reports that the patient has been struggling for the past few months.  Mother reports he has been isolating himself in his room.  Mother states he has no desire to do anything he just lies in the bed and will pay in a bottle just so he does not have to get up and do anything.  Mother reports reports that the patient told her he has no desire to live anymore and that he does not care about anything or anyone.  Patient denies any substance abuse but states he smokes marijuana.  Patient states he drinks 5-6 shots of vodka.  Patient denies any tobacco use.  Patient states not having job as a stressor in his life.  Patient denies any history of physical, mental, or sexual abuse.  Patient rates his appetite as good.  Patient rates his sleep as poor.  Patient denies any nightmares.  Patient rates his anxiety on a scale of 1-10 with 10 being the most severe a 7.  Patient rates his depression on a scale of 1-10 with 10 being the most severe a 9.  Patient states he has suicidal ideation.  Patient denies any homicidal ideation.  Patient denies any hallucinations.  Patient was admitted to [facility] psychiatry for further safety and  stabilization.    Available medical/psychiatric records reviewed and incorporated into the current document.     PAST PSYCHIATRIC HX: Patient has had no prior admissions.  Patient denies any outpatient care.    SUBSTANCE USE HX: UDS was positive for THC.  See HPI for current use.    SOCIAL HX: Patient states he was born in [place].  Patient states he was raised in [place].  Patient states he currently resides with his mother and brother in [place].  Patient states he is single and has no children.  Patient states he is currently unemployed.  Patient states he has some college education.  Patient denies any legal issues.    Past Medical History:   Diagnosis Date    Anxiety     Depression     Suicidal thoughts        Past Surgical History:   Procedure Laterality Date    NO PAST SURGERIES         Family History   Problem Relation Age of Onset    Drug abuse Mother     Drug abuse Father          Medications Prior to Admission   Medication Sig Dispense Refill Last Dose    propranolol (INDERAL) 10 MG tablet Take 1 tablet by mouth 2 (two) times a day as needed for anxiety. 60 tablet 1 10/1/2023    sertraline (Zoloft) 50 MG tablet Take « tablet by mouth daily for 7 days, then increase to 1 tablet by mouth daily. 30 tablet 0 10/1/2023           ALLERGIES:  Patient has no known allergies.    Temp:  [96.7 øF (35.9 øC)-97.4 øF (36.3 øC)] 97.4 øF (36.3 øC)  Heart Rate:  [61-76] 76  Resp:  [16-18] 18  BP: (119-155)/(66-89) 119/66    REVIEW OF SYSTEMS:  Review of Systems   Constitutional: Negative.    HENT: Negative.     Eyes: Negative.    Respiratory: Negative.     Cardiovascular: Negative.    Gastrointestinal: Negative.    Endocrine: Negative.    Genitourinary: Negative.    Musculoskeletal: Negative.    Skin: Negative.    Allergic/Immunologic: Negative.    Neurological: Negative.    Hematological: Negative.    Psychiatric/Behavioral:  Positive for dysphoric mood and suicidal ideas. The patient is  nervous/anxious.     See HPI for psychiatric ROS  OBJECTIVE    PHYSICAL EXAM:  Physical Exam  Constitutional:  Appears well-developed and well-nourished.   HENT:   Head: Normocephalic and atraumatic.   Right Ear: External ear normal.   Left Ear: External ear normal.   Mouth/Throat: Oropharynx is clear and moist.   Eyes: Pupils are equal, round, and reactive to light. Conjunctivae and EOM are normal.   Neck: Normal range of motion. Neck supple.   Cardiovascular: Normal rate, regular rhythm and normal heart sounds.    Respiratory: Effort normal and breath sounds normal. No respiratory distress. No wheezes.   GI: Soft. Bowel sounds are normal.No distension. There is no tenderness.   Musculoskeletal: Normal range of motion. No edema or deformity.   Neurological:No cranial nerve deficit. Coordination normal.   Skin: Skin is warm and dry. No rash noted. No erythema.     MENTAL STATUS EXAM:   Hygiene:   fair  Cooperation:  Cooperative  Eye Contact:  Good  Psychomotor Behavior:  Appropriate  Affect:  Appropriate  Hopelessness: 5  Speech:  Normal  Linear  Thought Content:  Normal  Suicidal:  Suicidal Ideation  Homicidal:  None  Hallucinations:  None  Delusion:  None  Memory:  Intact  Orientation:  Person, Place, Time, and Situation  Reliability:  fair  Insight:  Fair  Judgement:  Poor  Impulse Control:  Poor      Imaging Results (Last 24 Hours)       ** No results found for the last 24 hours. **             ECG/EMG Results (most recent)       Procedure Component Value Units Date/Time    ECG 12 Lead Other; Baseline Cardiac Status [[id number]] Collected: 10/02/23 1746     Updated: 10/02/23 2316     QT Interval 388 ms      QTC Interval 393 ms     Narrative:      Test Reason : Other~  Blood Pressure :   */*   mmHG  Vent. Rate :  62 BPM     Atrial Rate :  62 BPM     P-R Int : 128 ms          QRS Dur :  90 ms      QT Int : 388 ms       P-R-T Axes :  63  81  72 degrees     QTc Int : 393 ms    Normal sinus rhythm  Normal  ECG  Confirmed by Lacho Thorpe ([id number]) on 10/2/2023 11:16:44 PM    Referred By: MIKHAIL           Confirmed By: Lacho Thorpe             Lab Results   Component Value Date    GLUCOSE 100 (H) 10/02/2023    BUN 14 10/02/2023    CREATININE 0.90 10/02/2023    BCR 15.6 10/02/2023    CO2 25.5 10/02/2023    CALCIUM 10.3 10/02/2023    ALBUMIN 5.1 10/02/2023    AST 38 10/02/2023    ALT 51 (H) 10/02/2023       Lab Results   Component Value Date    WBC 6.33 10/02/2023    HGB 15.0 10/02/2023    HCT 42.7 10/02/2023    MCV 87.9 10/02/2023     10/02/2023       Pain Management Panel          Latest Ref Rng & Units 10/2/2023   Pain Management Panel   Amphetamine, Urine Qual Negative Negative    Barbiturates Screen, Urine Negative Negative    Benzodiazepine Screen, Urine Negative Negative    Buprenorphine, Screen, Urine Negative Negative    Cocaine Screen, Urine Negative Negative    Fentanyl, Urine Negative Negative    Methadone Screen , Urine Negative Negative    Methamphetamine, Ur Negative Negative        Brief Urine Lab Results  (Last result in the past 365 days)        Color   Clarity   Blood   Leuk Est   Nitrite   Protein   CREAT   Urine HCG        10/02/23 1111 Yellow   Clear   Negative   Negative   Negative   Negative                   DATA  Labs reviewed. Glucose 100, Alk phos 130, ALT 51, total bilirubin 1.5. UDS positive for THC.   EKG reviewed. QTc 393.   LAUREANO reviewed.   Record reviewed. The patient has been seen once in the [facility] for anxiety and depression        ASSESSMENT & PLAN:        Suicidal ideation  -SP 3      Severe episode of recurrent major depressive disorder, without psychotic features  -Increase sertraline   -Individual and group psychotherapy      Generalized anxiety disorder with panic attacks  -Sertraline and psychotherapy as above      Alcohol use disorder severe  -Monitor for withdrawals  -May consider referral to IOP.       THC use disorder moderate  -Supportive  treatment      The patient has been admitted for safety and stabilization.  Patient will be monitored for suicidality daily and maintained on Special Precautions Level 3 (q15 min checks) .  The patient will have individual and group therapy with a master's level therapist. A master treatment plan will be developed and agreed upon by the patient and his/her treatment team.  The patient's estimated length of stay in the hospital is 5-7 days.       Written by Naina Daily acting as scribe for Dr.Mazhar Dallas signature on this note affirms that the note adequately documents the care provided.   This note was generated using a scribe,   Naina Daily MA  10/03/23  9:56 AM EDT    Constantino ALATORRE MD, personally performed the services described in this documentation as scribed by the above named individual in my presence, and it is both accurate and complete.        Constantino ALATORRE MD, personally performed the services described in this documentation as scribed by the above named individual in my presence, and it is both accurate and complete.
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
## 2023-10-03 NOTE — PLAN OF CARE
Problem: Adult Behavioral Health Plan of Care  Goal: Plan of Care Review  Outcome: Ongoing, Progressing  Flowsheets  Taken 10/3/2023 1421 by Anurag Bales, RN  Progress: improving  Outcome Evaluation: PATIENT VERBALIZES POOR SLEEP, ANXIETY AND DEPRESSION (CANNOT/WILL NOT RATE) AS ONLY PROBLEMS THIS SHIFT. PATIENT IS AOX3, COOPERATIVE WITH NO S/S OF ACUTE DISTRESS NOTED. NNO NOTED  Taken 10/3/2023 1200 by Amber Serna MSW  Plan of Care Reviewed With: patient  Patient Agreement with Plan of Care: agrees  Taken 10/3/2023 0800 by Anurag Bales, RN  Plan of Care Reviewed With: patient  Patient Agreement with Plan of Care: agrees   Goal Outcome Evaluation:  Plan of Care Reviewed With: patient  Patient Agreement with Plan of Care: agrees     Progress: improving  Outcome Evaluation: PATIENT VERBALIZES POOR SLEEP, ANXIETY AND DEPRESSION (CANNOT/WILL NOT RATE) AS ONLY PROBLEMS THIS SHIFT. PATIENT IS AOX3, COOPERATIVE WITH NO S/S OF ACUTE DISTRESS NOTED. NNO NOTED

## 2023-10-04 PROCEDURE — 99232 SBSQ HOSP IP/OBS MODERATE 35: CPT | Performed by: PSYCHIATRY & NEUROLOGY

## 2023-10-04 RX ADMIN — Medication 100 MG: at 08:24

## 2023-10-04 RX ADMIN — Medication 2 TABLET: at 08:24

## 2023-10-04 RX ADMIN — SERTRALINE 50 MG: 50 TABLET, FILM COATED ORAL at 08:24

## 2023-10-04 RX ADMIN — Medication 1 TABLET: at 08:24

## 2023-10-04 NOTE — PLAN OF CARE
Goal Outcome Evaluation:  Plan of Care Reviewed With: patient  Patient Agreement with Plan of Care: agrees     Progress: improving  Outcome Evaluation: Patient calm and cooperative. Rates anxiety a 5 and depression a 3. Deneis SI/HI or AVH.

## 2023-10-04 NOTE — PLAN OF CARE
Goal Outcome Evaluation:  Plan of Care Reviewed With: patient  Patient Agreement with Plan of Care: agrees     Progress: improving  Outcome Evaluation: Patient was calm and cooperative during assessment. He said his appetite was good and that he is sleeping good also. He rated anxiety as 5/10 and depression as 3/10. He denied SI/HI/AVH. He spent much of the evening watching television and took a shower before bed.

## 2023-10-04 NOTE — PLAN OF CARE
Goal Outcome Evaluation:  Plan of Care Reviewed With: patient  Patient Agreement with Plan of Care: agrees  Consent Given to Review Plan with: grandmother  Progress: improving  Outcome Evaluation: Therapist met with Patient to reviw treatment progress and aftercare plans; Patient agreeable.        Problem: Adult Behavioral Health Plan of Care  Goal: Plan of Care Review  Outcome: Ongoing, Progressing  Flowsheets  Taken 10/4/2023 1511  Progress: improving  Plan of Care Reviewed With: patient  Patient Agreement with Plan of Care: agrees  Outcome Evaluation:   Therapist met with Patient to reviw treatment progress and aftercare plans   Patient agreeable.  Taken 10/3/2023 1200  Consent Given to Review Plan with: grandmother  Goal: Optimized Coping Skills in Response to Life Stressors  Outcome: Ongoing, Progressing  Flowsheets (Taken 10/4/2023 1511)  Optimized Coping Skills in Response to Life Stressors: making progress toward outcome  Intervention: Promote Effective Coping Strategies  Flowsheets (Taken 10/4/2023 1511)  Supportive Measures:   active listening utilized   counseling provided   goal-setting facilitated   verbalization of feelings encouraged  Goal: Develops/Participates in Therapeutic [place] to Support Successful Transition  Outcome: Ongoing, Progressing  Flowsheets (Taken 10/4/2023 1511)  Develops/Participates in Therapeutic [place] to Support Successful Transition: making progress toward outcome  Intervention: Foster Therapeutic [place]  Flowsheets (Taken 10/4/2023 1511)  Trust Relationship/Rapport:   care explained   thoughts/feelings acknowledged   emotional support provided   questions answered   empathic listening provided   choices provided   questions encouraged   reassurance provided     DATA: Therapist met with Patient individually this date. Patient agreeable to discuss current treatment progress and discharge concerns.     CLINICAL MANUVERING/INTERVENTIONS:  Assisted Patient in processing  session content; acknowledged and normalized Patient's thoughts, feelings, and concerns by utilizing a person-centered approach in efforts to build appropriate rapport and a positive therapeutic relationship with open and honest communication. Allowed Patient to ventilate regarding current stressors and triggers for negative emotions and thoughts in a safe nonjudgmental environment with unconditional positive regard, active listening skills, and empathy.     ASSESSMENT: Patient was seen 1-1 for a follow up today. Patient continues to receive treatment for SI. He reports that he is feeling better and more hopeful today. He is still struggling with anxiety, but feels that being here is helping him learn how to deal with things better. We spoke about healthy coping skills to use at home, specifically when dealing with anger and impulsivity. He states that exercise is very import to him. Specifically mountain biking and running. He also states that he can call a friend to talk with or hangout with in order to have a healthy distraction.     PLAN:   Patient will continue stabilization. Patient will continue to receive services offered by Treatment Team.     Patient will follow-up with [facility].

## 2023-10-04 NOTE — PROGRESS NOTES
"INPATIENT PSYCHIATRIC PROGRESS NOTE    Name:  Bryon Grace  :  2000  MRN:  [id number]  Visit Number:  [id number]  Length of stay:  2    SUBJECTIVE    CC/Focus of Exam: depression, anxiety    INTERVAL HISTORY:  The patient reports he is feeling better and he is more future oriented at this time. States counseling has been helpful.   He admits that he has had some bad experiences growing up. His biological parents have a history of drug use and they were never in his life and he was raised by his maternal grandparents and his grandfather  when patient was 11 or 12 and he is very wary of disappointments and losses and he doubts himself and wonders if it is worth trying to make things better.   Depression rating 3/10  Anxiety rating 5/10  Sleep: fair  Withdrawal sx: None  Cravin/10    Review of Systems   Constitutional: Negative.    Respiratory: Negative.     Cardiovascular: Negative.    Gastrointestinal: Negative.    Psychiatric/Behavioral:  The patient is nervous/anxious.      OBJECTIVE    Temp:  [97.5 øF (36.4 øC)-97.8 øF (36.6 øC)] 97.5 øF (36.4 øC)  Heart Rate:  [63-99] 99  Resp:  [18] 18  BP: (146-153)/(79-88) 146/80    MENTAL STATUS EXAM:  Appearance:Casually dressed, good hygeine.   Cooperation:Cooperative  Psychomotor: No psychomotor agitation/retardation, No EPS, No motor tics  Speech-normal rate, amount.  Mood \"anxious\"   Affect-congruent, appropriate, stable  Thought Content-goal directed, no delusional material present  Thought process-linear, organized.  Suicidality: No SI  Homicidality: No HI  Perception: No AH/VH  Insight-fair   Judgement-fair    Lab Results (last 24 hours)       ** No results found for the last 24 hours. **               Imaging Results (Last 24 Hours)       ** No results found for the last 24 hours. **               ECG/EMG Results (most recent)       Procedure Component Value Units Date/Time    ECG 12 Lead Other; Baseline Cardiac Status [[id number]] Collected: 10/02/23 " 1746     Updated: 10/02/23 2316     QT Interval 388 ms      QTC Interval 393 ms     Narrative:      Test Reason : Other~  Blood Pressure :   */*   mmHG  Vent. Rate :  62 BPM     Atrial Rate :  62 BPM     P-R Int : 128 ms          QRS Dur :  90 ms      QT Int : 388 ms       P-R-T Axes :  63  81  72 degrees     QTc Int : 393 ms    Normal sinus rhythm  Normal ECG  Confirmed by aLcho Thorpe ([id number]) on 10/2/2023 11:16:44 PM    Referred By: MIKHAIL           Confirmed By: Lacho Thorpe             ALLERGIES: Patient has no known allergies.    Medication Review:   Scheduled Medications:  B-complex with vitamin C, 2 tablet, Oral, Daily  multivitamin with minerals, 1 tablet, Oral, Daily  nicotine, 1 patch, Transdermal, Q24H  sertraline, 50 mg, Oral, Daily  thiamine, 100 mg, Oral, Daily         PRN Medications:    aluminum-magnesium hydroxide-simethicone    benzonatate    benztropine **OR** benztropine    famotidine    hydrOXYzine    ibuprofen    loperamide    magnesium hydroxide    ondansetron    propranolol    sodium chloride    traZODone   All medications reviewed.    ASSESSMENT & PLAN:      Suicidal ideation  -SP 3       Severe episode of recurrent major depressive disorder, without psychotic features  -Increased sertraline on admission   -Individual and group psychotherapy  -Encouraged patient to avoid all or none thinking       Generalized anxiety disorder with panic attacks  -Sertraline and psychotherapy as above       Alcohol use disorder severe  -Monitor for withdrawals  -May consider referral to IOP.        THC use disorder moderate  -Supportive treatment    Special precautions: Special Precautions Level 3 (q15 min checks) .    Behavioral Health Treatment Plan and Problem List: I have reviewed and approved the Behavioral Health Treatment Plan and Problem list.  The patient has had a chance to review and agrees with the treatment plan.    Copied text in portions of this note has been reviewed and is accurate as of  10/04/23         Clinician:  Constantino Dallas MD  10/04/23  11:11 EDT

## 2023-10-05 PROCEDURE — 99232 SBSQ HOSP IP/OBS MODERATE 35: CPT | Performed by: PSYCHIATRY & NEUROLOGY

## 2023-10-05 RX ADMIN — Medication 100 MG: at 09:29

## 2023-10-05 RX ADMIN — SERTRALINE 50 MG: 50 TABLET, FILM COATED ORAL at 09:29

## 2023-10-05 RX ADMIN — Medication 2 TABLET: at 09:29

## 2023-10-05 RX ADMIN — Medication 1 TABLET: at 09:29

## 2023-10-05 NOTE — PROGRESS NOTES
"INPATIENT PSYCHIATRIC PROGRESS NOTE    Name:  Bryon Grace  :  2000  MRN:  [id number]  Visit Number:  [id number]  Length of stay:  3    SUBJECTIVE    CC/Focus of Exam: depression, anxiety    INTERVAL HISTORY:  The patient states he was able to talk to his mother last night and though it was not the best conversation, it was casual and civil, but afterwards he felt very anxious and nervous worried about the future. He states he is afraid that he will do something similar to what he is experiencing now and is scared of being a disappointment.   Depression rating 3/10  Anxiety rating 5/10  Sleep: fair  Withdrawal sx: None  Cravin/10    Review of Systems   Constitutional: Negative.    Respiratory: Negative.     Cardiovascular: Negative.    Gastrointestinal: Negative.    Psychiatric/Behavioral:  The patient is nervous/anxious.      OBJECTIVE    Temp:  [97.4 øF (36.3 øC)-97.9 øF (36.6 øC)] 97.4 øF (36.3 øC)  Heart Rate:  [83-86] 86  Resp:  [18] 18  BP: (126-157)/(75-84) 126/75    MENTAL STATUS EXAM:  Appearance:Casually dressed, good hygeine.   Cooperation:Cooperative  Psychomotor: No psychomotor agitation/retardation, No EPS, No motor tics  Speech-normal rate, amount.  Mood \"anxious\"   Affect-congruent, appropriate, stable  Thought Content-goal directed, no delusional material present  Thought process-linear, organized.  Suicidality: No SI  Homicidality: No HI  Perception: No AH/VH  Insight-fair   Judgement-fair    Lab Results (last 24 hours)       ** No results found for the last 24 hours. **               Imaging Results (Last 24 Hours)       ** No results found for the last 24 hours. **               ECG/EMG Results (most recent)       Procedure Component Value Units Date/Time    ECG 12 Lead Other; Baseline Cardiac Status [[id number]] Collected: 10/02/23 1746     Updated: 10/02/23 2316     QT Interval 388 ms      QTC Interval 393 ms     Narrative:      Test Reason : Other~  Blood Pressure :   */*   " mmHG  Vent. Rate :  62 BPM     Atrial Rate :  62 BPM     P-R Int : 128 ms          QRS Dur :  90 ms      QT Int : 388 ms       P-R-T Axes :  63  81  72 degrees     QTc Int : 393 ms    Normal sinus rhythm  Normal ECG  Confirmed by Lacho Thorpe ([id number]) on 10/2/2023 11:16:44 PM    Referred By: MIKHAIL           Confirmed By: Lacho Thorpe             ALLERGIES: Patient has no known allergies.    Medication Review:   Scheduled Medications:  B-complex with vitamin C, 2 tablet, Oral, Daily  multivitamin with minerals, 1 tablet, Oral, Daily  nicotine, 1 patch, Transdermal, Q24H  sertraline, 50 mg, Oral, Daily  thiamine, 100 mg, Oral, Daily         PRN Medications:    aluminum-magnesium hydroxide-simethicone    benzonatate    benztropine **OR** benztropine    famotidine    hydrOXYzine    ibuprofen    loperamide    magnesium hydroxide    ondansetron    propranolol    sodium chloride    traZODone   All medications reviewed.    ASSESSMENT & PLAN:      Suicidal ideation  -SP 3       Severe episode of recurrent major depressive disorder, without psychotic features  -Increased sertraline on admission   -Individual and group psychotherapy  -Encouraged patient to avoid all or none thinking       Generalized anxiety disorder with panic attacks  -Sertraline and psychotherapy as above       Alcohol use disorder severe  -Monitor for withdrawals  -May consider referral to IOP.        THC use disorder moderate  -Supportive treatment    Special precautions: Special Precautions Level 3 (q15 min checks) .    Behavioral Health Treatment Plan and Problem List: I have reviewed and approved the Behavioral Health Treatment Plan and Problem list.  The patient has had a chance to review and agrees with the treatment plan.    Copied text in portions of this note has been reviewed and is accurate as of 10/05/23         Clinician:  Constantino Dallas MD  10/05/23  11:22 EDT

## 2023-10-05 NOTE — PLAN OF CARE
Goal Outcome Evaluation:  Plan of Care Reviewed With: patient  Patient Agreement with Plan of Care: agrees  Consent Given to Review Plan with: grandmother  Progress: improving  Outcome Evaluation: Therapist met with Patient to review treatment progress and aftercare plans; Patient agreeable.        Problem: Adult Behavioral Health Plan of Care  Goal: Plan of Care Review  Outcome: Ongoing, Progressing  Flowsheets  Taken 10/5/2023 1455 by Amber Serna MSW  Plan of Care Reviewed With: patient  Patient Agreement with Plan of Care: agrees  Outcome Evaluation:   Therapist met with Patient to review treatment progress and aftercare plans   Patient agreeable.  Taken 10/5/2023 0008 by Oliverio Shepard RN  Progress: improving  Taken 10/3/2023 1200 by Amber Serna MSW  Consent Given to Review Plan with: grandmother  Goal: Optimized Coping Skills in Response to Life Stressors  Outcome: Ongoing, Progressing  Flowsheets (Taken 10/5/2023 1455)  Optimized Coping Skills in Response to Life Stressors: making progress toward outcome  Intervention: Promote Effective Coping Strategies  Flowsheets (Taken 10/5/2023 1455)  Supportive Measures:   active listening utilized   counseling provided   goal-setting facilitated   verbalization of feelings encouraged  Goal: Develops/Participates in Therapeutic [place] to Support Successful Transition  Outcome: Ongoing, Progressing  Flowsheets (Taken 10/5/2023 1455)  Develops/Participates in Therapeutic [place] to Support Successful Transition: making progress toward outcome  Intervention: Foster Therapeutic [place]  Flowsheets (Taken 10/5/2023 1455)  Trust Relationship/Rapport:   care explained   empathic listening provided   reassurance provided   choices provided   questions answered   thoughts/feelings acknowledged   emotional support provided   questions encouraged     DATA: Therapist met with Patient individually this date. Patient agreeable to discuss current treatment progress and  discharge concerns.     This therapist spoke with Patient's mother today to provide an update. This therapist also completed safety planning with her. She was encouraged to secure all weapons, harmful objects, and medications in the home. She wqas also encouraged to call 911 should Patient get violent again in the future.     CLINICAL MANUVERING/INTERVENTIONS:  Assisted Patient in processing session content; acknowledged and normalized Patient's thoughts, feelings, and concerns by utilizing a person-centered approach in efforts to build appropriate rapport and a positive therapeutic relationship with open and honest communication. Allowed Patient to ventilate regarding current stressors and triggers for negative emotions and thoughts in a safe nonjudgmental environment with unconditional positive regard, active listening skills, and empathy.     ASSESSMENT: Patient was seen 1-1 for a follow up today. Patient continues to receive treatment for depression and anxiety. Patient continues to report improvement in mood. He was very open during our discussion today and was able to identify negative behavioral patterns that he has seen in himself, such as always needing to out do others even if it is something negative. He feels that if someone is unfair towards him then he needs to be more unfair towards them. We discussed the concept of radical acceptance and other distress tolerance skills. He was provided reading material about radical acceptance and deep breathing as he struggled with anxiety last night. He states that he wants to learn to cope with these things rather than finding the easy way out such as drinking or taking PRN medications. Patient also spoke about his future goal of making step towards becoming more independent. He continues to be polite and cooperative.       PLAN:   Patient will continue stabilization. Patient will continue to receive services offered by Treatment Team.     Patient will follow-up  with [facility].

## 2023-10-05 NOTE — PLAN OF CARE
Goal Outcome Evaluation:  Plan of Care Reviewed With: patient  Patient Agreement with Plan of Care: agrees     Progress: improving  Outcome Evaluation: Patient was calm and cooperative during assessment. He said his appetite was good and that he is sleeping poorly. He rated anxiety as 5/10 and depression as 3/10. He denied SI/HI/AVH. He spent much of the evening watching television before bed.

## 2023-10-05 NOTE — PLAN OF CARE
Goal Outcome Evaluation:  Plan of Care Reviewed With: patient  Patient Agreement with Plan of Care: agrees     Progress: improving  Outcome Evaluation: Patient calm and cooperative. Patient has spent most of his time in his room. Rates anxiety a 4 and depression a 3. Denies SI/HI or AVH. Patient is hopeful to go home soon.

## 2023-10-06 PROCEDURE — 99232 SBSQ HOSP IP/OBS MODERATE 35: CPT | Performed by: PSYCHIATRY & NEUROLOGY

## 2023-10-06 RX ADMIN — SERTRALINE 50 MG: 50 TABLET, FILM COATED ORAL at 09:00

## 2023-10-06 RX ADMIN — Medication 1 TABLET: at 09:00

## 2023-10-06 RX ADMIN — HYDROXYZINE HYDROCHLORIDE 50 MG: 50 TABLET ORAL at 20:46

## 2023-10-06 RX ADMIN — Medication 100 MG: at 09:00

## 2023-10-06 RX ADMIN — Medication 2 TABLET: at 09:00

## 2023-10-06 RX ADMIN — TRAZODONE HYDROCHLORIDE 50 MG: 50 TABLET ORAL at 20:46

## 2023-10-06 NOTE — PROGRESS NOTES
"INPATIENT PSYCHIATRIC PROGRESS NOTE    Name:  Bryon Grace  :  2000  MRN:  [id number]  Visit Number:  [id number]  Length of stay:  4    SUBJECTIVE    CC/Focus of Exam: depression, anxiety    INTERVAL HISTORY:  The patient states he is struggling with his real or perceived inadequacies because he feels there is no room for a mistake in his life and he feels he has used up all of his second chances. He is encouraged to not think in absolute terms.  Depression rating 3/10  Anxiety rating 4/10  Sleep: fair  Withdrawal sx: None  Cravin/10    Review of Systems   Constitutional: Negative.    Respiratory: Negative.     Cardiovascular: Negative.    Gastrointestinal: Negative.    Psychiatric/Behavioral:  The patient is nervous/anxious.      OBJECTIVE    Temp:  [97 øF (36.1 øC)-97.4 øF (36.3 øC)] 97.4 øF (36.3 øC)  Heart Rate:  [] 64  Resp:  [18] 18  BP: (141-145)/(71-77) 145/71    MENTAL STATUS EXAM:  Appearance:Casually dressed, good hygeine.   Cooperation:Cooperative  Psychomotor: No psychomotor agitation/retardation, No EPS, No motor tics  Speech-normal rate, amount.  Mood \"anxious\"   Affect-congruent, appropriate, stable  Thought Content-goal directed, no delusional material present  Thought process-linear, organized.  Suicidality: No SI  Homicidality: No HI  Perception: No AH/VH  Insight-fair   Judgement-fair    Lab Results (last 24 hours)       ** No results found for the last 24 hours. **               Imaging Results (Last 24 Hours)       ** No results found for the last 24 hours. **               ECG/EMG Results (most recent)       Procedure Component Value Units Date/Time    ECG 12 Lead Other; Baseline Cardiac Status [[id number]] Collected: 10/02/23 1746     Updated: 10/02/23 2316     QT Interval 388 ms      QTC Interval 393 ms     Narrative:      Test Reason : Other~  Blood Pressure :   */*   mmHG  Vent. Rate :  62 BPM     Atrial Rate :  62 BPM     P-R Int : 128 ms          QRS Dur :  90 ms      " QT Int : 388 ms       P-R-T Axes :  63  81  72 degrees     QTc Int : 393 ms    Normal sinus rhythm  Normal ECG  Confirmed by Lacho Thorpe ([id number]) on 10/2/2023 11:16:44 PM    Referred By: MIKHAIL           Confirmed By: Lacho Thorpe             ALLERGIES: Patient has no known allergies.    Medication Review:   Scheduled Medications:  B-complex with vitamin C, 2 tablet, Oral, Daily  multivitamin with minerals, 1 tablet, Oral, Daily  nicotine, 1 patch, Transdermal, Q24H  sertraline, 50 mg, Oral, Daily  thiamine, 100 mg, Oral, Daily         PRN Medications:    aluminum-magnesium hydroxide-simethicone    benzonatate    benztropine **OR** benztropine    famotidine    hydrOXYzine    ibuprofen    loperamide    magnesium hydroxide    ondansetron    propranolol    sodium chloride    traZODone   All medications reviewed.    ASSESSMENT & PLAN:      Suicidal ideation  -SP 3       Severe episode of recurrent major depressive disorder, without psychotic features  -Increase sertraline to 100 mg daily   -Individual and group psychotherapy  -Encouraged patient to think more positive.       Generalized anxiety disorder with panic attacks  -Sertraline and psychotherapy as above       Alcohol use disorder severe  -Monitor for withdrawals  -May consider referral to IOP.        THC use disorder moderate  -Supportive treatment    Special precautions: Special Precautions Level 3 (q15 min checks) .    Behavioral Health Treatment Plan and Problem List: I have reviewed and approved the Behavioral Health Treatment Plan and Problem list.  The patient has had a chance to review and agrees with the treatment plan.    Copied text in portions of this note has been reviewed and is accurate as of 10/06/23         Clinician:  Constantino Dallas MD  10/06/23  09:40 EDT

## 2023-10-06 NOTE — PLAN OF CARE
Goal Outcome Evaluation:  Plan of Care Reviewed With: patient  Patient Agreement with Plan of Care: agrees  Consent Given to Review Plan with: grandmother  Progress: improving  Outcome Evaluation: Therpaist met with Patient to review treatment progress and aftercare plans; Patient agreeable.        Problem: Adult Behavioral Health Plan of Care  Goal: Plan of Care Review  Outcome: Ongoing, Progressing  Flowsheets  Taken 10/6/2023 1205  Progress: improving  Plan of Care Reviewed With: patient  Patient Agreement with Plan of Care: agrees  Outcome Evaluation:   Therpaist met with Patient to review treatment progress and aftercare plans   Patient agreeable.  Taken 10/3/2023 1200  Consent Given to Review Plan with: grandmother  Goal: Optimized Coping Skills in Response to Life Stressors  Outcome: Ongoing, Progressing  Flowsheets (Taken 10/6/2023 1205)  Optimized Coping Skills in Response to Life Stressors: making progress toward outcome  Intervention: Promote Effective Coping Strategies  Flowsheets (Taken 10/6/2023 1205)  Supportive Measures:   active listening utilized   counseling provided   goal-setting facilitated   verbalization of feelings encouraged  Goal: Develops/Participates in Therapeutic [place] to Support Successful Transition  Outcome: Ongoing, Progressing  Flowsheets (Taken 10/6/2023 1205)  Develops/Participates in Therapeutic [place] to Support Successful Transition: making progress toward outcome  Intervention: Foster Therapeutic [place]  Flowsheets (Taken 10/6/2023 1205)  Trust Relationship/Rapport:   care explained   thoughts/feelings acknowledged   choices provided   emotional support provided   empathic listening provided   questions answered   questions encouraged   reassurance provided     DATA: Therapist met with Patient individually this date. Patient agreeable to discuss current treatment progress and discharge concerns.     CLINICAL MANUVERING/INTERVENTIONS:  Assisted Patient in processing  session content; acknowledged and normalized Patient's thoughts, feelings, and concerns by utilizing a person-centered approach in efforts to build appropriate rapport and a positive therapeutic relationship with open and honest communication. Allowed Patient to ventilate regarding current stressors and triggers for negative emotions and thoughts in a safe nonjudgmental environment with unconditional positive regard, active listening skills, and empathy.     ASSESSMENT: Patient was seen 1-1 for a follow up today. Patient continues to receive treatment for depression and anxiety. He reports that both of these symptoms have become much more manageable at this time. He spoke about an issue that he has in which he described what seems to be disassociating in times of experiencing high anxiety, stress, or anger. He feels that in these times he tries to go to his happy place where he is no longer experiencing the stress. We spoke about how this could be helpful as long as it is not resulting in him avoiding needed responsibilities. We also discussed stress management and self care that could help prevent him from getting to this state. Patient continues to be receptive to therapy efforts, and shares good insight.     PLAN:   Patient will continue stabilization. Patient will continue to receive services offered by Treatment Team.     Patient will follow-up with [facility].

## 2023-10-06 NOTE — PLAN OF CARE
Goal Outcome Evaluation:  Plan of Care Reviewed With: patient  Patient Agreement with Plan of Care: agrees        Outcome Evaluation: Patient calm and cooperative. Denied BETTY, HI, MAXX.

## 2023-10-06 NOTE — PLAN OF CARE
Problem: Feelings of Worthlessness, Hopelessness or Excessive Guilt (Depressive Signs/Symptoms)  Goal: Enhanced Self-Esteem and Confidence (Depressive Signs/Symptoms)  Outcome: Ongoing, Progressing     Problem: Suicidal Behavior  Goal: Suicidal Behavior is Absent or Managed  Outcome: Ongoing, Progressing   Goal Outcome Evaluation:  Plan of Care Reviewed With: patient  Patient Agreement with Plan of Care: agrees     Progress: improving

## 2023-10-07 PROCEDURE — 99232 SBSQ HOSP IP/OBS MODERATE 35: CPT | Performed by: PSYCHIATRY & NEUROLOGY

## 2023-10-07 RX ADMIN — Medication 100 MG: at 08:30

## 2023-10-07 RX ADMIN — Medication 2 TABLET: at 08:29

## 2023-10-07 RX ADMIN — NICOTINE TRANSDERMAL SYSTEM 1 PATCH: 21 PATCH, EXTENDED RELEASE TRANSDERMAL at 08:31

## 2023-10-07 RX ADMIN — HYDROXYZINE HYDROCHLORIDE 50 MG: 50 TABLET ORAL at 08:31

## 2023-10-07 RX ADMIN — HYDROXYZINE HYDROCHLORIDE 50 MG: 50 TABLET ORAL at 21:19

## 2023-10-07 RX ADMIN — TRAZODONE HYDROCHLORIDE 50 MG: 50 TABLET ORAL at 21:19

## 2023-10-07 RX ADMIN — Medication 1 TABLET: at 08:31

## 2023-10-07 RX ADMIN — SERTRALINE 100 MG: 50 TABLET, FILM COATED ORAL at 08:30

## 2023-10-07 NOTE — PLAN OF CARE
Problem: Adult Behavioral Health Plan of Care  Goal: Plan of Care Review  Outcome: Ongoing, Progressing  Flowsheets  Taken 10/7/2023 1513 by Anurag Bales, RN  Outcome Evaluation: PATIENT VERBALIZES MILD ANXIETY AND DEPRESSION AS ONLY PROBLEMS THIS SHIFT. PATIENT IS AOX3, COOPERATIVE WITH NO S/S OF ACUTE DISTRESS NOTED.  Taken 10/7/2023 0846 by Anurag Bales, RN  Plan of Care Reviewed With: patient  Patient Agreement with Plan of Care: agrees  Taken 10/6/2023 1822 by Brielle Bradford, RN  Progress: improving   Goal Outcome Evaluation:  Plan of Care Reviewed With: patient  Patient Agreement with Plan of Care: agrees     Progress: improving  Outcome Evaluation: PATIENT VERBALIZES MILD ANXIETY AND DEPRESSION AS ONLY PROBLEMS THIS SHIFT. PATIENT IS AOX3, COOPERATIVE WITH NO S/S OF ACUTE DISTRESS NOTED.

## 2023-10-08 PROCEDURE — 99232 SBSQ HOSP IP/OBS MODERATE 35: CPT | Performed by: PSYCHIATRY & NEUROLOGY

## 2023-10-08 RX ADMIN — Medication 100 MG: at 10:12

## 2023-10-08 RX ADMIN — HYDROXYZINE HYDROCHLORIDE 50 MG: 50 TABLET ORAL at 21:13

## 2023-10-08 RX ADMIN — SERTRALINE 100 MG: 50 TABLET, FILM COATED ORAL at 10:12

## 2023-10-08 RX ADMIN — Medication 1 TABLET: at 10:10

## 2023-10-08 RX ADMIN — NICOTINE TRANSDERMAL SYSTEM 1 PATCH: 21 PATCH, EXTENDED RELEASE TRANSDERMAL at 12:48

## 2023-10-08 RX ADMIN — HYDROXYZINE HYDROCHLORIDE 50 MG: 50 TABLET ORAL at 10:10

## 2023-10-08 RX ADMIN — TRAZODONE HYDROCHLORIDE 50 MG: 50 TABLET ORAL at 21:13

## 2023-10-08 RX ADMIN — Medication 2 TABLET: at 10:09

## 2023-10-08 NOTE — PROGRESS NOTES
"INPATIENT PSYCHIATRIC PROGRESS NOTE    Name:  Bryon Grace  :  2000  MRN:  [id number]  Visit Number:  [id number]  Length of stay:  6    SUBJECTIVE    CC/Focus of Exam: depression, anxiety    INTERVAL HISTORY:  The patient states he is feeling good and is trying to think more positive. He states he will be able to go home tomorrow.  Depression rating 1/10  Anxiety rating 2/10  Sleep: fair  Withdrawal sx: None  Cravin/10    Review of Systems   Constitutional: Negative.    Respiratory: Negative.     Cardiovascular: Negative.    Gastrointestinal: Negative.        OBJECTIVE    Temp:  [96.3 øF (35.7 øC)-97.9 øF (36.6 øC)] 96.3 øF (35.7 øC)  Heart Rate:  [81-86] 86  Resp:  [16-18] 18  BP: (128-138)/(70-85) 128/70    MENTAL STATUS EXAM:  Appearance:Casually dressed, good hygeine.   Cooperation:Cooperative  Psychomotor: No psychomotor agitation/retardation, No EPS, No motor tics  Speech-normal rate, amount.  Mood \"better\"   Affect-congruent, appropriate, stable  Thought Content-goal directed, no delusional material present  Thought process-linear, organized.  Suicidality: No SI  Homicidality: No HI  Perception: No AH/VH  Insight-fair   Judgement-fair    Lab Results (last 24 hours)       ** No results found for the last 24 hours. **               Imaging Results (Last 24 Hours)       ** No results found for the last 24 hours. **               ECG/EMG Results (most recent)       Procedure Component Value Units Date/Time    ECG 12 Lead Other; Baseline Cardiac Status [[id number]] Collected: 10/02/23 1746     Updated: 10/02/23 2316     QT Interval 388 ms      QTC Interval 393 ms     Narrative:      Test Reason : Other~  Blood Pressure :   */*   mmHG  Vent. Rate :  62 BPM     Atrial Rate :  62 BPM     P-R Int : 128 ms          QRS Dur :  90 ms      QT Int : 388 ms       P-R-T Axes :  63  81  72 degrees     QTc Int : 393 ms    Normal sinus rhythm  Normal ECG  Confirmed by Lacho Thorpe ([id number]) on 10/2/2023 11:16:44 " PM    Referred By: MIKHAIL           Confirmed By: Lacho Thorpe             ALLERGIES: Patient has no known allergies.    Medication Review:   Scheduled Medications:  B-complex with vitamin C, 2 tablet, Oral, Daily  multivitamin with minerals, 1 tablet, Oral, Daily  nicotine, 1 patch, Transdermal, Q24H  sertraline, 100 mg, Oral, Daily  thiamine, 100 mg, Oral, Daily         PRN Medications:    aluminum-magnesium hydroxide-simethicone    benzonatate    benztropine **OR** benztropine    famotidine    hydrOXYzine    ibuprofen    loperamide    magnesium hydroxide    ondansetron    propranolol    sodium chloride    traZODone   All medications reviewed.    ASSESSMENT & PLAN:      Suicidal ideation  -SP 3       Severe episode of recurrent major depressive disorder, without psychotic features  -Increased sertraline to 100 mg daily   -Individual and group psychotherapy  -Encouraged patient to think more positive.       Generalized anxiety disorder with panic attacks  -Sertraline and psychotherapy as above       Alcohol use disorder severe  -Monitor for withdrawals  -May consider referral to IOP.        THC use disorder moderate  -Supportive treatment    Special precautions: Special Precautions Level 3 (q15 min checks) .    Behavioral Health Treatment Plan and Problem List: I have reviewed and approved the Behavioral Health Treatment Plan and Problem list.  The patient has had a chance to review and agrees with the treatment plan.    Copied text in portions of this note has been reviewed and is accurate as of 10/08/23         Clinician:  Constantino Dallas MD  10/08/23  19:26 EDT

## 2023-10-08 NOTE — PLAN OF CARE
Problem: Adult Behavioral Health Plan of Care  Goal: Plan of Care Review  Outcome: Ongoing, Progressing  Flowsheets  Taken 10/8/2023 1340  Progress: improving  Outcome Evaluation: PATIENT VERBALIZES VERY MILD ANXIETY AND DEPRESSION AS ONLY PROBLEMS THIS SHIFT. NO S/S OF ACUTE DISTRESS NOTED.  Taken 10/8/2023 0725  Plan of Care Reviewed With: patient  Patient Agreement with Plan of Care: agrees   Goal Outcome Evaluation:  Plan of Care Reviewed With: patient  Patient Agreement with Plan of Care: agrees     Progress: improving  Outcome Evaluation: PATIENT VERBALIZES VERY MILD ANXIETY AND DEPRESSION AS ONLY PROBLEMS THIS SHIFT. NO S/S OF ACUTE DISTRESS NOTED.

## 2023-10-08 NOTE — PLAN OF CARE
"Goal Outcome Evaluation:  Plan of Care Reviewed With: patient  Patient Agreement with Plan of Care: agrees        Outcome Evaluation: Patient reports appetite great and sleep fair. Patient rated anxiety 4/10 and depression 0/10 with overall mood of \"fine.\" Patient denies SI, HI, or AVH with no plans this shift. Patient admits to bowel movement this shift 10/07/2023. Patient education; coping strategies;patient admits that running helps. Patient admits he runs at the [place] and then takes a swim afterwards. Patient watched a movie and was cooperative/calm. Patient SP3.         "

## 2023-10-09 VITALS
BODY MASS INDEX: 23.49 KG/M2 | TEMPERATURE: 97.1 F | SYSTOLIC BLOOD PRESSURE: 122 MMHG | HEIGHT: 71 IN | RESPIRATION RATE: 18 BRPM | HEART RATE: 77 BPM | DIASTOLIC BLOOD PRESSURE: 71 MMHG | WEIGHT: 167.8 LBS | OXYGEN SATURATION: 98 %

## 2023-10-09 PROCEDURE — 99238 HOSP IP/OBS DSCHRG MGMT 30/<: CPT | Performed by: PSYCHIATRY & NEUROLOGY

## 2023-10-09 RX ORDER — SERTRALINE HYDROCHLORIDE 100 MG/1
100 TABLET, FILM COATED ORAL DAILY
Qty: 30 TABLET | Refills: 0 | Status: SHIPPED | OUTPATIENT
Start: 2023-10-10

## 2023-10-09 RX ORDER — TRAZODONE HYDROCHLORIDE 50 MG/1
50 TABLET ORAL NIGHTLY PRN
Qty: 30 TABLET | Refills: 0 | Status: SHIPPED | OUTPATIENT
Start: 2023-10-09

## 2023-10-09 RX ADMIN — Medication 100 MG: at 09:08

## 2023-10-09 RX ADMIN — SERTRALINE 100 MG: 50 TABLET, FILM COATED ORAL at 09:08

## 2023-10-09 RX ADMIN — Medication 1 TABLET: at 09:08

## 2023-10-09 RX ADMIN — Medication 2 TABLET: at 09:08

## 2023-10-09 NOTE — DISCHARGE SUMMARY
:  2000  MRN:  [id number]  Visit Number:  [id number]      Date of Admission:10/2/2023   Date of Discharge:  10/9/2023    Discharge Diagnosis:  Principal Problem:    Suicidal ideation  Active Problems:    Severe episode of recurrent major depressive disorder, without psychotic features    Generalized anxiety disorder with panic attacks    Alcohol use disorder, severe, dependence        Admission Diagnosis:  MDD (major depressive disorder) [F32.9]     PHILIP Grace is a 23 y.o. male who was admitted on 10/2/2023 with complaints of depression.   For details please see H&P dated 10/3/23.     Hospital Course  Patient is a 23 y.o. male presented with depression and hopelessness with suicidal ideations. The patient was admitted to the [facility] adult psych unit for safety, further evaluation and treatment.  The patient was continued on sertraline and dose was increased to 100 mg daily.   The patient was also able to take part in individual and group counseling sessions and work on appropriate coping skills. He was encouraged to be not too hard on himself and not think in absolute terms where he didn't want to deal with any setback in life. He admitted that he was tired of making too many mistakes and felt he couldn't do anything right. His expectations were normalized and he was encouraged to use his mistakes as learning opportunities and to be cognizant of the fact that as humans we all make mistakes in life.   He apparently was using alcohol to cope with his sense of inadequacy, and was monitored for alcohol withdrawals but he didn't exhibit any.  The patient made steady improvement in his mood and expressed feeling more positive and hopeful about future. Sleep and appetite were improved.  The day of discharge the patient was calm, cooperative and pleasant. Mood was reported to be good, and denied SI/HI/AVH. Also reported no medication side effects.  .      Mental Status Exam upon discharge:   Mood  "\"good\"   Affect-congruent, appropriate, stable  Thought Content-goal directed, no delusional material present  Thought process-linear, organized.  Suicidality: No SI  Homicidality: No HI  Perception: No AH/VH    Procedures Performed         Consults:   Consults       No orders found from 9/3/2023 to 10/3/2023.            Pertinent Test Results:   Admission on 10/02/2023   Component Date Value Ref Range Status    Hepatitis B Surface Ag 10/02/2023 Non-Reactive  Non-Reactive Final    Hep A IgM 10/02/2023 Non-Reactive  Non-Reactive Final    Hep B C IgM 10/02/2023 Non-Reactive  Non-Reactive Final    Hepatitis C Ab 10/02/2023 Non-Reactive  Non-Reactive Final    QT Interval 10/02/2023 388  ms Final    QTC Interval 10/02/2023 393  ms Final   Admission on 10/02/2023, Discharged on 10/02/2023   Component Date Value Ref Range Status    Glucose 10/02/2023 100 (H)  65 - 99 mg/dL Final    BUN 10/02/2023 14  6 - 20 mg/dL Final    Creatinine 10/02/2023 0.90  0.76 - 1.27 mg/dL Final    Sodium 10/02/2023 141  136 - 145 mmol/L Final    Potassium 10/02/2023 4.2  3.5 - 5.2 mmol/L Final    Slight hemolysis detected by analyzer. Results may be affected.    Chloride 10/02/2023 103  98 - 107 mmol/L Final    CO2 10/02/2023 25.5  22.0 - 29.0 mmol/L Final    Calcium 10/02/2023 10.3  8.6 - 10.5 mg/dL Final    Total Protein 10/02/2023 7.6  6.0 - 8.5 g/dL Final    Albumin 10/02/2023 5.1  3.5 - 5.2 g/dL Final    ALT (SGPT) 10/02/2023 51 (H)  1 - 41 U/L Final    AST (SGOT) 10/02/2023 38  1 - 40 U/L Final    Alkaline Phosphatase 10/02/2023 130 (H)  39 - 117 U/L Final    Total Bilirubin 10/02/2023 1.5 (H)  0.0 - 1.2 mg/dL Final    Globulin 10/02/2023 2.5  gm/dL Final    A/G Ratio 10/02/2023 2.0  g/dL Final    BUN/Creatinine Ratio 10/02/2023 15.6  7.0 - 25.0 Final    Anion Gap 10/02/2023 12.5  5.0 - 15.0 mmol/L Final    eGFR 10/02/2023 124.6  >60.0 mL/min/1.73 Final    Color, UA 10/02/2023 Yellow  Yellow, Straw Final    Appearance, UA 10/02/2023 Clear "  Clear Final    pH, UA 10/02/2023 7.0  5.0 - 8.0 Final    Specific Gravity, UA 10/02/2023 1.018  1.005 - 1.030 Final    Glucose, UA 10/02/2023 Negative  Negative Final    Ketones, UA 10/02/2023 Negative  Negative Final    Bilirubin, UA 10/02/2023 Negative  Negative Final    Blood, UA 10/02/2023 Negative  Negative Final    Protein, UA 10/02/2023 Negative  Negative Final    Leuk Esterase, UA 10/02/2023 Negative  Negative Final    Nitrite, UA 10/02/2023 Negative  Negative Final    Urobilinogen, UA 10/02/2023 1.0 E.U./dL  0.2 - 1.0 E.U./dL Final    Ethanol 10/02/2023 <10  0 - 10 mg/dL Final    Ethanol % 10/02/2023 <0.010  % Final    THC, Screen, Urine 10/02/2023 Positive (A)  Negative Final    Phencyclidine (PCP), Urine 10/02/2023 Negative  Negative Final    Cocaine Screen, Urine 10/02/2023 Negative  Negative Final    Methamphetamine, Ur 10/02/2023 Negative  Negative Final    Opiate Screen 10/02/2023 Negative  Negative Final    Amphetamine Screen, Urine 10/02/2023 Negative  Negative Final    Benzodiazepine Screen, Urine 10/02/2023 Negative  Negative Final    Tricyclic Antidepressants Screen 10/02/2023 Negative  Negative Final    Methadone Screen, Urine 10/02/2023 Negative  Negative Final    Barbiturates Screen, Urine 10/02/2023 Negative  Negative Final    Oxycodone Screen, Urine 10/02/2023 Negative  Negative Final    Propoxyphene Screen 10/02/2023 Negative  Negative Final    Buprenorphine, Screen, Urine 10/02/2023 Negative  Negative Final    Magnesium 10/02/2023 1.9  1.6 - 2.6 mg/dL Final    WBC 10/02/2023 6.33  3.40 - 10.80 10*3/mm3 Final    RBC 10/02/2023 4.86  4.14 - 5.80 10*6/mm3 Final    Hemoglobin 10/02/2023 15.0  13.0 - 17.7 g/dL Final    Hematocrit 10/02/2023 42.7  37.5 - 51.0 % Final    MCV 10/02/2023 87.9  79.0 - 97.0 fL Final    MCH 10/02/2023 30.9  26.6 - 33.0 pg Final    MCHC 10/02/2023 35.1  31.5 - 35.7 g/dL Final    RDW 10/02/2023 11.6 (L)  12.3 - 15.4 % Final    RDW-SD 10/02/2023 37.3  37.0 - 54.0 fl  Final    MPV 10/02/2023 10.4  6.0 - 12.0 fL Final    Platelets 10/02/2023 262  140 - 450 10*3/mm3 Final    Neutrophil % 10/02/2023 55.6  42.7 - 76.0 % Final    Lymphocyte % 10/02/2023 31.8  19.6 - 45.3 % Final    Monocyte % 10/02/2023 10.3  5.0 - 12.0 % Final    Eosinophil % 10/02/2023 1.6  0.3 - 6.2 % Final    Basophil % 10/02/2023 0.5  0.0 - 1.5 % Final    Immature Grans % 10/02/2023 0.2  0.0 - 0.5 % Final    Neutrophils, Absolute 10/02/2023 3.53  1.70 - 7.00 10*3/mm3 Final    Lymphocytes, Absolute 10/02/2023 2.01  0.70 - 3.10 10*3/mm3 Final    Monocytes, Absolute 10/02/2023 0.65  0.10 - 0.90 10*3/mm3 Final    Eosinophils, Absolute 10/02/2023 0.10  0.00 - 0.40 10*3/mm3 Final    Basophils, Absolute 10/02/2023 0.03  0.00 - 0.20 10*3/mm3 Final    Immature Grans, Absolute 10/02/2023 0.01  0.00 - 0.05 10*3/mm3 Final    nRBC 10/02/2023 0.0  0.0 - 0.2 /100 WBC Final    Extra Tube 10/02/2023 Hold for add-ons.   Final    Auto resulted.    Extra Tube 10/02/2023 hold for add-on   Final    Auto resulted    Extra Tube 10/02/2023 Hold for add-ons.   Final    Auto resulted.    Extra Tube 10/02/2023 Hold for add-ons.   Final    Auto resulted    Fentanyl, Urine 10/02/2023 Negative  Negative Final        Condition on Discharge:  improved    Vital Signs  Temp:  [97.1 øF (36.2 øC)-98 øF (36.7 øC)] 97.1 øF (36.2 øC)  Heart Rate:  [77-78] 77  Resp:  [18] 18  BP: (122-153)/(71-82) 122/71      Discharge Disposition:  Home or Self Care    Discharge Medications:     Discharge Medications        New Medications        Instructions Start Date   traZODone 50 MG tablet  Commonly known as: DESYREL   50 mg, Oral, Nightly PRN             Changes to Medications        Instructions Start Date   sertraline 100 MG tablet  Commonly known as: Zoloft  What changed:   medication strength  how much to take  how to take this  when to take this  additional instructions   100 mg, Oral, Daily   Start Date: October 10, 2023            Continue These  Medications        Instructions Start Date   propranolol 10 MG tablet  Commonly known as: INDERAL   Take 1 tablet by mouth 2 (two) times a day as needed for anxiety.               Discharge Diet: Regular     Activity at Discharge: As tolerated     Follow-up Appointments  Future Appointments   Date Time Provider Department Center   10/13/2023 11:00 AM Gloria Parra [facility]       Time spent in discharge: < 30 min    Clinician:   Constantino Dallas MD  10/09/23  14:25 EDT

## 2023-10-09 NOTE — PLAN OF CARE
Goal Outcome Evaluation:  Plan of Care Reviewed With: patient  Patient Agreement with Plan of Care: agrees     Progress: improving  Outcome Evaluation: Pt calm and cooperative with no issues or concerns.

## 2023-10-09 NOTE — PROGRESS NOTES
Discharge Planning Assessment   Anupam     Patient Name: Bryon Grace  MRN: [id number]  Today's Date: 10/9/2023    Admit Date: 10/2/2023    Patient is being discharged home today. Patient denies SI, HI, and AVH. He reports that he is looking forward to returning home and that he has found treatment here to be very helpful. This therapist educated Patient about the crisis hotline and when to call 911 or present to the nearest emergency room. Safety planning has also been competed with Patient's aunt. She will transport him home today. Patient will follow up with [facility].     CONNOR Estevez

## 2023-10-10 NOTE — PROGRESS NOTES
Behavioral Health Discharge Summary             Please fax within 24 hours of discharge to [facility] at: [phone]      Member Name: Bryon Grace Member ID: [id number]   Authorization Number: [id number] Phone: [phone]   Member Address: [facility] [address] [address]   Discharge Date: 10/10/2023 Level of Care at Discharge:    Facility: [facility] Staff Completing Form: Mirta BROWN   If the member is being discharged directly to a residential or extended care program, please specify the type below.   __Private Child-Caring Facility (PCC) Residential/Group Home   __Private Child-Caring Facility (PCC) Therapeutic Foster Care   __Residential Treatment Facility (RTF)   __Psychiatric Residential Treatment Facility (PRTF I or II)   __Long-Term Acute Inpatient Hospital Services or Extended Care Unit (ECU)   __Other (please specify):    Brief discharge summary of treatment received (for follow up by the case management team): D/C clinical with list of medications and follow up appts given to patient upon discharge.     BRIEF SUMMARY OF RECOMMENDATIONS FOR ONGOING TREATMENT     Discharged to where: Home    Discharge diagnoses: F 33.3   Axis I:    Axis II:    Axis III:    Axis IV:    Axis V:    Does the member understand his/her DX?  Yes          Medication     Dose     Schedule Supply/  Quantity  Given at Discharge RX Provided  Yes/No  If Rx Provided, Quantity RX Prior Auth Required  Yes/No Prior Auth  Completed   Trazodone  50 mg  1 tab po HS PRN                                                                                        Does the member understand the reason for taking these medications? Yes                                                           FOLLOW-UP APPOINTMENTS   Please schedule within 7 days of discharge and provide appointment details for all referred services.    PCP/Other Providers Involved in Treatment:    Appointment Type: ROBERT GROSS Provider Name:    [facility] Provider Phone: [phone] Appointment Date: 10/13/23 Appointment Time: 11:00 AM with Gloria Parkinson   (new to OP services)        Case Management    Is the member already enrolled in case management?  Yes/No  If yes, date the CM was notified:    If no, was the CM referral offered?  Yes/No  Accepted? Yes/No    Is the Release of Information in the chart? Yes/No:      Medication Management (for member discharged with psychiatric medications):      A&D Treatment (for member with substance abuse/   dependence in the past year):      Medical Condition (for member with a medical condition):    Other recommended treatment:    Do you have any concerns about the discharge plan?  No    If yes, explain:    Was the member involved in the discharge planning?  Yes    If no, explain:    Was a copy of the discharge plan provided to the member?  Yes    If no, explain:

## 2023-10-12 ENCOUNTER — OFFICE VISIT (OUTPATIENT)
Dept: PSYCHIATRY | Facility: CLINIC | Age: 23
End: 2023-10-12
Payer: COMMERCIAL

## 2023-10-12 DIAGNOSIS — F41.1 GENERALIZED ANXIETY DISORDER: Primary | ICD-10-CM

## 2023-10-12 DIAGNOSIS — R45.851 PASSIVE SUICIDAL IDEATIONS: ICD-10-CM

## 2023-10-12 DIAGNOSIS — F32.A MILD DEPRESSION: ICD-10-CM

## 2023-10-12 DIAGNOSIS — F10.11 HISTORY OF ALCOHOL ABUSE: ICD-10-CM

## 2023-10-12 PROCEDURE — 90837 PSYTX W PT 60 MINUTES: CPT | Performed by: COUNSELOR
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
## 2023-11-01 ENCOUNTER — OFFICE VISIT (OUTPATIENT)
Dept: PSYCHIATRY | Facility: CLINIC | Age: 23
End: 2023-11-01
Payer: COMMERCIAL

## 2023-11-01 DIAGNOSIS — F41.1 GENERALIZED ANXIETY DISORDER: Primary | ICD-10-CM

## 2023-11-01 DIAGNOSIS — F33.1 MAJOR DEPRESSIVE DISORDER, RECURRENT EPISODE, MODERATE: ICD-10-CM

## 2023-11-01 DIAGNOSIS — Z63.9 CONFLICT BETWEEN PATIENT AND FAMILY: ICD-10-CM

## 2023-11-01 PROCEDURE — 1160F RVW MEDS BY RX/DR IN RCRD: CPT | Performed by: COUNSELOR

## 2023-11-01 PROCEDURE — 1159F MED LIST DOCD IN RCRD: CPT | Performed by: COUNSELOR

## 2023-11-01 PROCEDURE — 90837 PSYTX W PT 60 MINUTES: CPT | Performed by: COUNSELOR

## 2023-11-01 RX ORDER — SERTRALINE HYDROCHLORIDE 100 MG/1
100 TABLET, FILM COATED ORAL DAILY
Qty: 15 TABLET | Refills: 0 | Status: SHIPPED | OUTPATIENT
Start: 2023-11-01

## 2023-11-01 SDOH — SOCIAL STABILITY - SOCIAL INSECURITY: PROBLEM RELATED TO PRIMARY SUPPORT GROUP, UNSPECIFIED: Z63.9

## 2023-11-01 NOTE — PROGRESS NOTES
Patient called asking for a script for sertraline 100 mg until his next outpatient appt with the provider. Will order 2 weeks worth of medication. The patient is informed that after this more refills will not be provided and he will need to see his outpatient provider. He expressed understanding and agreement.
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
## 2023-12-26 ENCOUNTER — TELEMEDICINE (OUTPATIENT)
Dept: PSYCHIATRY | Facility: CLINIC | Age: 23
End: 2023-12-26
Payer: COMMERCIAL

## 2023-12-26 DIAGNOSIS — Z63.9 CONFLICT BETWEEN PATIENT AND FAMILY: ICD-10-CM

## 2023-12-26 DIAGNOSIS — F41.1 GENERALIZED ANXIETY DISORDER: Primary | ICD-10-CM

## 2023-12-26 DIAGNOSIS — F10.11 HISTORY OF ALCOHOL ABUSE: ICD-10-CM

## 2023-12-26 DIAGNOSIS — Z65.8 INADEQUATE SOCIAL SUPPORT: ICD-10-CM

## 2023-12-26 PROCEDURE — 1160F RVW MEDS BY RX/DR IN RCRD: CPT | Performed by: COUNSELOR

## 2023-12-26 PROCEDURE — 90837 PSYTX W PT 60 MINUTES: CPT | Performed by: COUNSELOR

## 2023-12-26 PROCEDURE — 1159F MED LIST DOCD IN RCRD: CPT | Performed by: COUNSELOR

## 2023-12-26 SDOH — SOCIAL STABILITY - SOCIAL INSECURITY: PROBLEM RELATED TO PRIMARY SUPPORT GROUP, UNSPECIFIED: Z63.9

## 2024-01-10 NOTE — PROGRESS NOTES
"    PROGRESS NOTE  Data:  Bryon Grace came in 12/26/2023 for his regularly scheduled therapy session starting at 2:30 PM and ending at 3:30 PM, with Gloria Parra [facility].    The provider is located at the [facility].  Clinic visit is electronic, conducted by Meena.  The patient gave consent to be seen remotely, and when consent was given understood that the consent allows for patient identifiable information to be sent to a third party as needed.  Patient may refuse to be seen remotely.  The electronic data is encrypted and password-protected, and the patient has been advised of the potential risks of privacy not withstanding such measures.     (Scales based on 0 - 10 with 10 being the worst)  Depression: 1 Anxiety: 2     HPI:   Patient reports his anxiety usually fluctuates between 4 and 6/10, with 10 being the most severe.  \"Today is the best day of the week.  I have had intrusive thoughts and get worked up with racing thoughts that cause my anxiety.\"    \"It ended up after that ordeal where I got kicked out and slept in my car for days, that I started searching for a job.  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  I got hired 2 days after applied and they pay $24 an hour.  I work from 5 AM to 1:30 PM is only 15 minutes from where I live.  I stay in a camper van.  It is a small camper about 20 feet x 8 feet.  It is parked at my friend's.  I already owned it so it saves me a lot of money.\"    \"I am clean and sober.\"    \"On Saturday I went snowboarding in [place].  I really love it.  When I was on the ski lift by myself, I started crying.  I could not understand because I was doing what I loved but I had a weird feeling.  I was thinking about my life and how I was there to have fun.  I was there with my friends, but was thinking about having fun but not having fun and wishing I was not there.  Yet there was no place I would rather be but I wished I was not anywhere.  I felt alone " "and it was super strange but I did not talk to anyone.  I look forward to having fun in the moment and then regret it.  I really loved and enjoyed but something wants to keep me from having fun.  \"I felt I was being lost.\"    \"I have family and friends were like family and best associates.  I hear my mother and brother's voices.  I love them but they have hurt me.\"    \"My definition of intimacy is really known each other.\"    \"I feel defective.\"    \"I feel that toxic loneliness was a big thing for me when I went out on my own.  The problem is that is not much to live for.\"    \"I long for the day when I will be able to rely on someone 100% and not be judged or looked down upon, someone equal to me.\"            Clinical Maneuvering/Intervention:  Assisted patient in processing above session content; acknowledged and normalized patient’s thoughts, feelings, and concerns.     Asked patient if he had a sense of wellbeing.  Discussed the concepts of toxic loneliness and healthy intimacy.  Patient is feeling lonely and is not getting his emotional needs met from having an adequate support network.  Celebrated and affirmed patient for his ability to find a good job in such a short time. Discussed the importance of using his money wisely.    Allowed patient to freely discuss issues without interruption or judgment. Provided safe, confidential environment to facilitate the development of positive therapeutic relationship and encourage open, honest communication. Assisted patient in identifying risk factors which would indicate the need for higher level of care including thoughts to harm self or others and/or self-harming behavior and encouraged patient to contact this office, call 911, or present to the nearest emergency room should any of these events occur. Discussed crisis intervention services and means to access.  Patient adamantly and convincingly denies current suicidal or homicidal ideation or perceptual " disturbance.        Assessment     Patient is struggling with the loss of healthy relationship with his mother/grandmother and brother, having an inadequate social support network he does not feel stable and longs for acceptance.    Diagnosis:   Encounter Diagnoses   Name Primary?    Generalized anxiety disorder Yes    Conflict between patient and family     History of alcohol abuse     Inadequate social support        Generalized anxiety disorder [F41.1]    Mental Status Exam  Hygiene:  good  Dress:  casual  Attitude:  Cooperative  Motor Activity:  Appropriate  Speech:  Normal  Mood:  within normal limits  Affect: Calm, anxious  Thought Processes:  Goal directed and Linear  Thought Content:  normal  Suicidal Thoughts:  does not  Homicidal Thoughts:  does not  Crisis Safety Plan: yes, to come to the emergency room.  Hallucinations:  does not          Patient's Support Network Includes: Friends    Progress toward goal: Not at goal    Functional Status: Mild impairment     Prognosis: Good with Ongoing Treatment       Plan         Patient will adhere to medication regimen as prescribed and report any side effects. Patient will contact this office, call 911 or present to the nearest emergency room should suicidal or homicidal ideations occur. Provide Cognitive Behavioral Therapy and Integrative Therapy to improve functioning, maintain stability, and avoid decompensation and the need for higher level of care.            Return in about 3 weeks (around 1/16/2024).            This document electronically signed by Gloria Parra, ROBERT, NCC, CSAT  January 10, 2024 14:48 EST    Plan